# Patient Record
Sex: FEMALE | Race: ASIAN | NOT HISPANIC OR LATINO | Employment: FULL TIME | URBAN - METROPOLITAN AREA
[De-identification: names, ages, dates, MRNs, and addresses within clinical notes are randomized per-mention and may not be internally consistent; named-entity substitution may affect disease eponyms.]

---

## 2023-06-26 ENCOUNTER — HOSPITAL ENCOUNTER (EMERGENCY)
Facility: CLINIC | Age: 43
Discharge: HOME | End: 2023-06-26
Attending: EMERGENCY MEDICINE | Admitting: EMERGENCY MEDICINE
Payer: COMMERCIAL

## 2023-06-26 VITALS
DIASTOLIC BLOOD PRESSURE: 62 MMHG | HEART RATE: 85 BPM | RESPIRATION RATE: 20 BRPM | SYSTOLIC BLOOD PRESSURE: 97 MMHG | TEMPERATURE: 97.7 F | OXYGEN SATURATION: 99 %

## 2023-06-26 DIAGNOSIS — D64.9 ANEMIA, UNSPECIFIED TYPE: ICD-10-CM

## 2023-06-26 DIAGNOSIS — R19.7 DIARRHEA, UNSPECIFIED TYPE: ICD-10-CM

## 2023-06-26 DIAGNOSIS — R55 SYNCOPE, UNSPECIFIED SYNCOPE TYPE: ICD-10-CM

## 2023-06-26 LAB
ALBUMIN SERPL BCG-MCNC: 4 G/DL (ref 3.5–5.2)
ALP SERPL-CCNC: 69 U/L (ref 35–104)
ALT SERPL W P-5'-P-CCNC: 16 U/L (ref 0–50)
ANION GAP SERPL CALCULATED.3IONS-SCNC: 15 MMOL/L (ref 7–15)
AST SERPL W P-5'-P-CCNC: 28 U/L (ref 0–45)
ATRIAL RATE - MUSE: 84 BPM
BASOPHILS # BLD AUTO: 0.1 10E3/UL (ref 0–0.2)
BASOPHILS NFR BLD AUTO: 1 %
BILIRUB DIRECT SERPL-MCNC: <0.2 MG/DL (ref 0–0.3)
BILIRUB SERPL-MCNC: <0.2 MG/DL
BUN SERPL-MCNC: 14 MG/DL (ref 6–20)
CALCIUM SERPL-MCNC: 8.7 MG/DL (ref 8.6–10)
CHLORIDE SERPL-SCNC: 102 MMOL/L (ref 98–107)
CREAT SERPL-MCNC: 0.74 MG/DL (ref 0.51–0.95)
DEPRECATED HCO3 PLAS-SCNC: 18 MMOL/L (ref 22–29)
DIASTOLIC BLOOD PRESSURE - MUSE: NORMAL MMHG
EOSINOPHIL # BLD AUTO: 0.3 10E3/UL (ref 0–0.7)
EOSINOPHIL NFR BLD AUTO: 2 %
ERYTHROCYTE [DISTWIDTH] IN BLOOD BY AUTOMATED COUNT: 16.5 % (ref 10–15)
GFR SERPL CREATININE-BSD FRML MDRD: >90 ML/MIN/1.73M2
GLUCOSE SERPL-MCNC: 155 MG/DL (ref 70–99)
HBA1C MFR BLD: 5.6 %
HCG SERPL QL: NEGATIVE
HCT VFR BLD AUTO: 31.8 % (ref 35–47)
HGB BLD-MCNC: 9.7 G/DL (ref 11.7–15.7)
IMM GRANULOCYTES # BLD: 0.1 10E3/UL
IMM GRANULOCYTES NFR BLD: 0 %
INTERPRETATION ECG - MUSE: NORMAL
LIPASE SERPL-CCNC: 38 U/L (ref 13–60)
LYMPHOCYTES # BLD AUTO: 3.1 10E3/UL (ref 0.8–5.3)
LYMPHOCYTES NFR BLD AUTO: 21 %
MCH RBC QN AUTO: 21.8 PG (ref 26.5–33)
MCHC RBC AUTO-ENTMCNC: 30.5 G/DL (ref 31.5–36.5)
MCV RBC AUTO: 72 FL (ref 78–100)
MONOCYTES # BLD AUTO: 0.9 10E3/UL (ref 0–1.3)
MONOCYTES NFR BLD AUTO: 6 %
NEUTROPHILS # BLD AUTO: 10.7 10E3/UL (ref 1.6–8.3)
NEUTROPHILS NFR BLD AUTO: 70 %
NRBC # BLD AUTO: 0 10E3/UL
NRBC BLD AUTO-RTO: 0 /100
P AXIS - MUSE: 79 DEGREES
PLATELET # BLD AUTO: 316 10E3/UL (ref 150–450)
POTASSIUM SERPL-SCNC: 3.6 MMOL/L (ref 3.4–5.3)
PR INTERVAL - MUSE: 158 MS
PROT SERPL-MCNC: 6.9 G/DL (ref 6.4–8.3)
QRS DURATION - MUSE: 76 MS
QT - MUSE: 386 MS
QTC - MUSE: 456 MS
R AXIS - MUSE: 83 DEGREES
RBC # BLD AUTO: 4.44 10E6/UL (ref 3.8–5.2)
SODIUM SERPL-SCNC: 135 MMOL/L (ref 136–145)
SYSTOLIC BLOOD PRESSURE - MUSE: NORMAL MMHG
T AXIS - MUSE: 66 DEGREES
TROPONIN T SERPL HS-MCNC: <6 NG/L
VENTRICULAR RATE- MUSE: 84 BPM
WBC # BLD AUTO: 15.1 10E3/UL (ref 4–11)

## 2023-06-26 PROCEDURE — 84703 CHORIONIC GONADOTROPIN ASSAY: CPT | Performed by: EMERGENCY MEDICINE

## 2023-06-26 PROCEDURE — 258N000003 HC RX IP 258 OP 636: Performed by: EMERGENCY MEDICINE

## 2023-06-26 PROCEDURE — 83036 HEMOGLOBIN GLYCOSYLATED A1C: CPT | Performed by: EMERGENCY MEDICINE

## 2023-06-26 PROCEDURE — 96361 HYDRATE IV INFUSION ADD-ON: CPT

## 2023-06-26 PROCEDURE — 96360 HYDRATION IV INFUSION INIT: CPT

## 2023-06-26 PROCEDURE — 36415 COLL VENOUS BLD VENIPUNCTURE: CPT | Performed by: EMERGENCY MEDICINE

## 2023-06-26 PROCEDURE — 82248 BILIRUBIN DIRECT: CPT | Performed by: EMERGENCY MEDICINE

## 2023-06-26 PROCEDURE — 250N000011 HC RX IP 250 OP 636: Mod: JZ

## 2023-06-26 PROCEDURE — 85014 HEMATOCRIT: CPT | Performed by: EMERGENCY MEDICINE

## 2023-06-26 PROCEDURE — 99284 EMERGENCY DEPT VISIT MOD MDM: CPT

## 2023-06-26 PROCEDURE — 80053 COMPREHEN METABOLIC PANEL: CPT | Performed by: EMERGENCY MEDICINE

## 2023-06-26 PROCEDURE — 83690 ASSAY OF LIPASE: CPT | Performed by: EMERGENCY MEDICINE

## 2023-06-26 PROCEDURE — 93005 ELECTROCARDIOGRAM TRACING: CPT

## 2023-06-26 PROCEDURE — 84484 ASSAY OF TROPONIN QUANT: CPT | Performed by: EMERGENCY MEDICINE

## 2023-06-26 RX ORDER — ONDANSETRON 2 MG/ML
INJECTION INTRAMUSCULAR; INTRAVENOUS
Status: COMPLETED
Start: 2023-06-26 | End: 2023-06-26

## 2023-06-26 RX ADMIN — SODIUM CHLORIDE 1000 ML: 9 INJECTION, SOLUTION INTRAVENOUS at 01:30

## 2023-06-26 RX ADMIN — ONDANSETRON 4 MG: 2 INJECTION INTRAMUSCULAR; INTRAVENOUS at 01:31

## 2023-06-26 ASSESSMENT — ACTIVITIES OF DAILY LIVING (ADL)
ADLS_ACUITY_SCORE: 35
ADLS_ACUITY_SCORE: 35

## 2023-06-26 NOTE — ED NOTES
Bed: ED11  Expected date:   Expected time:   Means of arrival:   Comments:  854 54F syncope episode

## 2023-06-26 NOTE — ED TRIAGE NOTES
"Pt BIBA folowing syncopal episode. Pt reports she went to a [employer] party earlier today but did not consume any alcohol. Was laying in bed and had abdominal cramping and urge to defecate. On her way to bathroom pt had syncopal episode and recovered.. was taken by daughter  to bathroom and had an  \"explosive diarrhea\" per her own report. EMS states she had a manic excited episode by the time they got there but she recovered to baseline A&O x 4  after a few minutes.   Pt c/o nausea. Denies vomiting. Having no abdominal pain at the moment.    Zahra Pascal RN        "

## 2023-06-26 NOTE — ED PROVIDER NOTES
History   Chief Complaint:  Vomiting, and syncope     The history is provided by the patient and the spouse.      Brianna Hernandez is a 42 year old female with a history of vaso vagal who presents with nausea, vomiting, and syncope. The patient reports that after the syncopal episode, she had watery diarrhea with associated nausea. Before the episode she was feeling very light headed, had a slight headache and had nausea. She denies any fever, or abdominal pain. She notes a history of vaso vagal syncope.  She had some minor palpitations and chest pain but none now.  No shortness of breath    Independent Historian:   The patient's spouse reports that she was in the bathroom and had laid down when she had an episode of syncope, and was unconscious for 20-30 seconds. She was nauseous, but did not vomit. He witnessed the episode, and saw no tremors or other seizure like activity. He saw no injuries from the fall, as she was sitting when it happened. They were at a graduation party today and had new food there, but denies any other dietary changes. She has been more stressed recently.     Review of External Notes:   I reviewed the patient's past medical records, including her previous laboratory findings from 2020, when her hemoglobin was 11.6.      Medications:    The patient is currently on no regular medications.     Past Medical History:    Vaso vagal episode    Hyperhidrosis  Anemia   Fibroadenoma    Hyperemesis   Vitamin D deficiency     Physical Exam     Patient Vitals for the past 24 hrs:   BP Temp Temp src Pulse Resp SpO2   06/26/23 0132 -- 97.7  F (36.5  C) Oral -- 22 --   06/26/23 0045 110/70 -- -- 78 17 99 %      Physical Exam  General: Sitting up in bed  Eyes:  The pupils are equal and round    Conjunctivae and sclerae are normal  ENT:    Wearing a mask  Neck:  Normal range of motion  CV:  Regular rate, regular rhythm    Skin warm and well perfused   Resp:  Non labored breathing on room air    No  tachypnea    No cough heard, lungs clear bilaterally  GI:  Abdomen is soft, there is no rigidity    No distension    No rebound tenderness     No abdominal tenderness  MS:  Normal muscular tone  Skin:  No rash or acute skin lesions noted  Neuro:   Awake, alert.      Speech is normal and fluent.    Face is symmetric.     Moves all extremities equally  Psych: Normal affect.  Appropriate interactions.    Emergency Department Course   ECG  ECG taken at 0131, ECG read at 0134  Normal sinus rhythm   Normal ECG   No significant change as compared to prior, dated 7/22/07.  Rate 84 bpm. AK interval 158 ms. QRS duration 76 ms. QT/QTc 386/456 ms. P-R-T axes 79 83 66.     Laboratory:  Labs Ordered and Resulted from Time of ED Arrival to Time of ED Departure   BASIC METABOLIC PANEL - Abnormal       Result Value    Sodium 135 (*)     Potassium 3.6      Chloride 102      Carbon Dioxide (CO2) 18 (*)     Anion Gap 15      Urea Nitrogen 14.0      Creatinine 0.74      Calcium 8.7      Glucose 155 (*)     GFR Estimate >90     CBC WITH PLATELETS AND DIFFERENTIAL - Abnormal    WBC Count 15.1 (*)     RBC Count 4.44      Hemoglobin 9.7 (*)     Hematocrit 31.8 (*)     MCV 72 (*)     MCH 21.8 (*)     MCHC 30.5 (*)     RDW 16.5 (*)     Platelet Count 316      % Neutrophils 70      % Lymphocytes 21      % Monocytes 6      % Eosinophils 2      % Basophils 1      % Immature Granulocytes 0      NRBCs per 100 WBC 0      Absolute Neutrophils 10.7 (*)     Absolute Lymphocytes 3.1      Absolute Monocytes 0.9      Absolute Eosinophils 0.3      Absolute Basophils 0.1      Absolute Immature Granulocytes 0.1      Absolute NRBCs 0.0     TROPONIN T, HIGH SENSITIVITY - Normal    Troponin T, High Sensitivity <6     HCG QUALITATIVE PREGNANCY - Normal    hCG Serum Qualitative Negative     LIPASE - Normal    Lipase 38     HEPATIC FUNCTION PANEL - Normal    Protein Total 6.9      Albumin 4.0      Bilirubin Total <0.2      Alkaline Phosphatase 69      AST 28       ALT 16      Bilirubin Direct <0.20     HEMOGLOBIN A1C - Normal    Hemoglobin A1C 5.6        Emergency Department Course & Assessments:  Interventions:  Medications   0.9% sodium chloride BOLUS (1,000 mLs Intravenous $New Bag 6/26/23 0130)   ondansetron (ZOFRAN) 2 MG/ML injection (4 mg  $Given 6/26/23 0131)      Assessments:  0150 I obtained history and examined the patient as reported above.   0410  I rechecked the patient and discussed her discharge to home.     Independent Interpretation (X-rays, CTs, rhythm strip):  None    Consultations/Discussion of Management or Tests:  None     Social Determinants of Health affecting care:   None    Disposition:  The patient was discharged to home.     Impression & Plan    Medical Decision Making:  Brianna Hernandez is a 42-year-old female who presented to the emergency department with syncope.  Has a history of vasovagal syncope which is likely reason today.  She was nauseous and feeling very lightheaded prior to the event which is why she laid down.  There are no injuries from the syncopal episode.  Does not sound consistent with seizure.  Did have diarrhea after the syncopal episode.  No abdominal tenderness on exam.  She is feeling improved here in the emergency department.  Blood work shows mild anemia.  She does have heavy menstrual periods which is likely cause of anemia.  Seems unlikely that this is can attributing to today.  She has no active bleeding right now.  No severe headache to suggest subarachnoid hemorrhage.  She feels improved in the emergency department and feels back to normal.  Able to ambulate and tolerate p.o. intake in the emergency department.  Doubt arrhythmia, ACS, PE, AAA or other concerning etiologies of syncope.  Follow-up with primary care provider for recheck of hemoglobin.    Diagnosis:    ICD-10-CM    1. Syncope, unspecified syncope type  R55       2. Diarrhea, unspecified type  R19.7       3. Anemia, unspecified type  D64.9            Scribe  Disclosure:  I, Anand Odennway, am serving as a scribe at 12:37 AM on 6/26/2023 to document services personally performed by Jackelin Rico MD based on my observations and the provider's statements to me.      6/26/2023   Jackelin Rico MD Goertz, Maria Kristine, MD  06/26/23 0724

## 2023-06-26 NOTE — DISCHARGE INSTRUCTIONS
Follow up with your regular doctor for recheck of hemoglobin  Make sure to stay hydrated    Discharge Instructions  Syncope    Syncope (fainting) is a sudden, short loss of consciousness (passing out spell). People will usually fall to the ground when they faint or slump over if seated.  People may also shake when this happens, and it can sometimes be difficult to tell the difference between syncope and a seizure. At this time, your provider does not find a reason to suspect that your fainting spell is a sign of anything dangerous or life-threatening.  However, sometimes the signs of serious illness do not show up right away.     Generally, every Emergency Department visit should have a follow-up clinic visit with either a primary or a specialty clinic/provider. Please follow-up as instructed by your emergency provider today.    Return to the Emergency Department if:  You faint again.   You have any significant bleeding.  You have chest pain or a fast or irregular heartbeat.  You feel short of breath.  You cough up any blood.  You have abdominal (belly) pain or unusual back pain.  You have ongoing vomiting (throwing up) or diarrhea (loose stools).  You have a black or tarry bowel movement, or blood in the stool or in your vomit.  You have a fever over 101 F.  You lose feeling or cannot move a part of your body or cannot talk normally.  You are confused, have a headache, cannot see well, or have a seizure.  DO NOT DRIVE. CALL 911 INSTEAD!    What can I do to help myself?  Follow any specific instructions that your provider discussed with you.  If you feel light-headed, make sure to sit down right away, even if you have to sit on the floor.  Follow up with your regular medical provider as discussed for further management. This may include lowering your blood pressure medications, insulin or other diabetic medications, checking your blood sugar more frequently, and drinking more fluids, taking medicines for vomiting or  diarrhea or getting up slower.  If you were given a prescription for medicine here today, be sure to read all of the information (including the package insert) that comes with your prescription.  This will include important information about the medicine, its side effects, and any warnings that you need to know about.  The pharmacist who fills the prescription can provide more information and answer questions you may have about the medicine.  If you have questions or concerns that the pharmacist cannot address, please call or return to the Emergency Department.   Remember that you can always come back to the Emergency Department if you are not able to see your regular provider in the amount of time listed above, if you get any new symptoms, or if there is anything that worries you.

## 2024-04-19 ENCOUNTER — TRANSFERRED RECORDS (OUTPATIENT)
Dept: HEALTH INFORMATION MANAGEMENT | Facility: CLINIC | Age: 44
End: 2024-04-19

## 2024-05-22 ENCOUNTER — PATIENT OUTREACH (OUTPATIENT)
Dept: ONCOLOGY | Facility: CLINIC | Age: 44
End: 2024-05-22
Payer: COMMERCIAL

## 2024-05-22 ENCOUNTER — TRANSCRIBE ORDERS (OUTPATIENT)
Dept: ONCOLOGY | Facility: CLINIC | Age: 44
End: 2024-05-22
Payer: COMMERCIAL

## 2024-05-22 DIAGNOSIS — R68.89 SUSPECTED MALIGNANT NEOPLASM OF OVARY: Primary | ICD-10-CM

## 2024-05-22 NOTE — PROGRESS NOTES
"New Patient Hematology / Oncology Nurse Navigator Note     Referral Date: 5/22/24    Referring provider:   Cristina [place] [address]   [phone] (Work)   [phone] (Fax)    Referring Clinic/Organization: [facility]     Referred to: GynOnc    Requested provider (if applicable): Recommended any provider, highly recommend: Dr. Causey, Dr. Ramon, Dr. Hernandez, Dr. Shepherd     Evaluation for : S/p hysterectomy. Pathology with STIL on fallopian tube removed during surgery, negative for oncogenes, still has increased risk given family history      Clinical History (per Nurse review of records provided):    5/20/24 Office Visit with OBGYN at :  \"STIL: I would still recommend consultation with gyn onc regarding STIL with ovaries still in body. They may recommend oopherectomy regardless of test results now or at an age closer to normal menopause. Or, they may recommend interval ca 125 and pelvic ultrasound. Pt is in agreement. Scheduling information reveiwed. \"-- BOOKMARKED  3/20/24 Path:  Final Diagnosis A) UTERUS WITH CERVIX AND FALLOPIAN TUBES, TOTAL HYSTERECTOMY WITH BILATERAL SALPINGECTOMY:  1. Cervix: No significant histologic abnormality  2. Endometrium: Proliferative phase  3. Myometrium: Leiomyomas  4. Uterine serosa: Unremarkable  5. Right fallopian tube (entirely submitted):     a. No significant histologic abnormality     b. Metallic coil grossly identified  6. Left fallopian tube (entirely submitted):     a. Incidental serous tubal intraepithelial lesion (STIL)     b. Metallic coil grossly identified  7. Uterine weight: 123 grams  8. Negative for malignancy   11/13/23 Pelvic US -- BOOKMARKED      Clinical Assessment / Barriers to Care (Per Nurse):  Pt lives in [place], [place]    Records Location: Care Everywhere     Records Needed:   Images from     Additional testing needed prior to consult:   N/A    Referral updates and Plan:   OUTGOING CALL to pt:  Introduced my " role as nurse navigator with [facility] Hematology/Oncology dept and that we have recd the referral to GynOnc from Dr Tracy  Pt confirms they are aware of the referral and ready to schedule  Provided contact information if future questions arise.     -Transferred pt to NPS line [phone] to schedule appt per scheduling instructions.      Felicity Burks, BSN, RN, PHN, OCN  Hematology/Oncology Nurse Navigator  [facility]  [phone]

## 2024-06-07 NOTE — TELEPHONE ENCOUNTER
Action 2024 8:56 AM ABT   Action Taken Records from  received and sent to HIM for upload and to  email for review     Action 2024 11:55 AM ABT   Action Taken 16 Slides from [facility] received and taken to 5th floor path lab for review.    24: [id number]     Imaging Received  2024 2:27 PM ABT   Action: US Images from  received and resolved to PACS.     RECORDS STATUS - ALL OTHER DIAGNOSIS      RECORDS RECEIVED FROM: Karen BRUMFIELD   NOTES STATUS DETAILS   OFFICE NOTE from referring provider [facility] 24: Dr. Cristina Tracy   [facility]   OPERATIVE REPORT [facility] 24: Total laparoscopic hysterectomy, bilateral salpingectomy, cystoscopy    MEDICATION LIST [facility]    LABS     PATHOLOGY REPORTS Report in [facility] &   Req per ANGIE Abebe & provider  [facility]:  24: [id number]    HP:  23:[id number]   ANYTHING RELATED TO DIAGNOSIS [facility] Most recent 24   GENONOMIC TESTING     TYPE: External: [facility] 24: [id number]  24: [id number]  24: [id number]   IMAGING (NEED IMAGES & REPORT)     ULTRASOUND PACS HP:  23-12/21/15: US Pelvic

## 2024-06-11 ENCOUNTER — PRE VISIT (OUTPATIENT)
Dept: ONCOLOGY | Facility: CLINIC | Age: 44
End: 2024-06-11
Payer: COMMERCIAL

## 2024-06-17 ENCOUNTER — LAB REQUISITION (OUTPATIENT)
Dept: LAB | Facility: CLINIC | Age: 44
End: 2024-06-17
Payer: COMMERCIAL

## 2024-06-17 LAB
PATH REPORT.COMMENTS IMP SPEC: NORMAL
PATH REPORT.FINAL DX SPEC: NORMAL
PATH REPORT.GROSS SPEC: NORMAL
PATH REPORT.MICROSCOPIC SPEC OTHER STN: NORMAL
PATH REPORT.RELEVANT HX SPEC: NORMAL
PATH REPORT.RELEVANT HX SPEC: NORMAL
PATH REPORT.SITE OF ORIGIN SPEC: NORMAL

## 2024-06-17 PROCEDURE — 88321 CONSLTJ&REPRT SLD PREP ELSWR: CPT | Performed by: PATHOLOGY

## 2024-06-25 ENCOUNTER — ONCOLOGY VISIT (OUTPATIENT)
Dept: ONCOLOGY | Facility: CLINIC | Age: 44
End: 2024-06-25
Attending: OBSTETRICS & GYNECOLOGY
Payer: COMMERCIAL

## 2024-06-25 VITALS
HEIGHT: 65 IN | DIASTOLIC BLOOD PRESSURE: 77 MMHG | RESPIRATION RATE: 16 BRPM | TEMPERATURE: 98.5 F | HEART RATE: 80 BPM | OXYGEN SATURATION: 100 % | SYSTOLIC BLOOD PRESSURE: 110 MMHG | BODY MASS INDEX: 23.16 KG/M2 | WEIGHT: 139 LBS

## 2024-06-25 DIAGNOSIS — N83.9 LESION OF FALLOPIAN TUBE: Primary | ICD-10-CM

## 2024-06-25 PROCEDURE — 99205 OFFICE O/P NEW HI 60 MIN: CPT | Performed by: OBSTETRICS & GYNECOLOGY

## 2024-06-25 PROCEDURE — 99213 OFFICE O/P EST LOW 20 MIN: CPT | Performed by: OBSTETRICS & GYNECOLOGY

## 2024-06-25 ASSESSMENT — PAIN SCALES - GENERAL: PAINLEVEL: NO PAIN (0)

## 2024-06-25 NOTE — PROGRESS NOTES
Gynecologic Oncology New Patient Consult    Referring provider:    Cristina Tracy MD  [facility]  [address] [place] [address]   RE: Brianna Hernandez  : 1980  DANIEL: 2024      CC: STIC lesion     HPI: Ms Brianna Hernandez is a 43 year old year old female who presents for consultation.     Patient has an abnormal pathology finding, STIL of the left fallopian tube. Discussed in depth. She has a family history of both breast and ovarian cancer. Mother had breast cancer age 53. Maternal Aunt has had ovarian cancer, another maternal aunt with ovarian cancer. And Brianna was two 1st cousins with ovarian cancer. None have been tested for BRCA. She was seen by the cancer geneticis, and was found to have overall risk of 19% and was told she could consider genetic testing. She then underwent a TLH, BS  with general gynecology which was positive for a STIL lesion for which she was then referred to gyn oncology.     Post-operatively she is healing well. No vaginal bleeding, energy back to normal.     Treatment History:    3/20/24: TLH, BS  Had heavy periods and decided to undergo surgery.     Surg Path: OSH: Pathology  A) UTERUS WITH CERVIX AND FALLOPIAN TUBES, TOTAL HYSTERECTOMY WITH BILATERAL SALPINGECTOMY:   1. Cervix: No significant histologic abnormality   2. Endometrium: Proliferative phase   3. Myometrium: Leiomyomas   4. Uterine serosa: Unremarkable   5. Right fallopian tube (entirely submitted):   a. No significant histologic abnormality   b. Metallic coil grossly identified   6. Left fallopian tube (entirely submitted):   a. Incidental serous tubal intraepithelial lesion (STIL)   b. Metallic coil grossly identified   7. Uterine weight: 123 grams   8. 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  Genetic testing negative.    PMH:  Negative    PSH:  Hysterectomy    FH:  Mom breast cancer (53),  No genetic testing.    SH:  Lives with  and 2 kids.  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      No past medical history on file.    No past surgical history on file.     Medications and allergies reviewed in EPIC    Social History:  Social History     Tobacco Use    Smoking status: Not on file    Smokeless tobacco: Not on file   Substance Use Topics    Alcohol use: Not on file     Family History:   The patient's family history is notable for :.    Physical Exam:     There were no vitals taken for this visit.  There is no height or weight on file to calculate BMI.    General: Alert and oriented, no acute distress  Psych: Mood stable. Linear speech, appropriate affect  CV: RRR  Lungs: CTA  GI: No distention. No masses. No hernia.   Lymph: No enlarge lymph nodes in neck or groin  Abdomen: well healed surgical scars, no hepatomegaly   : Normal external genitalia.     Assessment:    Brianna Hernandez is a 43 year old woman with a new diagnosis of STIC lesion.        60 minutes spent on the date of the encounter doing chart review, history and exam, documentation and further activities as noted above    The longitudinal plan of care for the diagnosis(es)/condition(s) as documented were addressed during this visit. Due to the added complexity in care, I will continue to support Brianna in the subsequent management and with ongoing continuity of care.    Plan:     1.)   STIC Lesions :  Pathology 2nd opinion reviewed in detail.  Reviewed that STIC lesions have been proposed as a precursor of high grade serous carcinoma.  Incidence primarily reported in patients with BRCA mutations or strong family history with range of  0.6-6%.  This patient has a strong family history but her genetic testing is negative. In patients with incidental STIC lesions within general population, the prevelence of STIC is unclear, possibly in range of 0.76%.  Clinical signifcance and prognosis less clear.  Given her family history as well as STIC lesion, recommend completion staging with pelvic washings, bilateral oophorectomy, omental and peritoneal biopsies. Discussed risks, benefits, and alternatives to procedure.      Patient would like me to proceed with scheduling surgery.  Her  was present on phone for discussion.  She and her  are also going to get a second opinion with his sister who is a physician in [place].    Recommend:  Laparoscopic bilateral oophorectomy, peritoneal biopsies, omental biopsy, peritoneal washings, possible laparotomy and cancer staging     Risks, benefits and alternatives to proceed discussed in detail with the patient. Risks include but are not limited to bleeding, infection, possible injury to surrounding organs including bowel, bladder, ureter, need for second procedure/surgery related to complications from first procedure, postoperative medical complications such as cardiopulmonary events, lymphedema, lymphocyst, thromboembolic events. Also discussed specific risks related to the procedure including conversion to laparotomy, surgical menopause.. Consent for surgery, blood transfusion signed.  Will arrange appropriate preoperative blood work, CT. Patient also advised on need for postoperative surveillance and/or adjuvant therapy. Questions answered, patient will be contacted with a surgical date.    Netta Shepherd MD  Gynecologic Oncology  [facility]

## 2024-06-25 NOTE — LETTER
2024      Brianna Hernandez  [address] [place] [address]      Dear Colleague,    Thank you for referring your patient, Brianna Hernandez, to the [facility]. Please see a copy of my visit note below.    Gynecologic Oncology New Patient Consult    Referring provider:    Cristina Tracy MD  [facility]  [facility] [address]  [place] [address]   RE: Brianna Hernandez  : 1980  DANIEL: 2024      CC: STIC lesion     HPI: Ms Brianna Hernandez is a 43 year old year old female who presents for consultation.     Patient has an abnormal pathology finding, STIL of the left fallopian tube. Discussed in depth. She has a family history of both breast and ovarian cancer. Mother had breast cancer age 53. Maternal Aunt has had ovarian cancer, another maternal aunt with ovarian cancer. And Brianna was two 1st cousins with ovarian cancer. None have been tested for BRCA. She was seen by the cancer geneticis, and was found to have overall risk of 19% and was told she could consider genetic testing. She then underwent a TLH, BS  with general gynecology which was positive for a STIL lesion for which she was then referred to gyn oncology.     Post-operatively she is healing well. No vaginal bleeding, energy back to normal.     Treatment History:    3/20/24: TLH, BS  Had heavy periods and decided to undergo surgery.     Surg Path: OSH: Pathology  A) UTERUS WITH CERVIX AND FALLOPIAN TUBES, TOTAL HYSTERECTOMY WITH BILATERAL SALPINGECTOMY:   1. Cervix: No significant histologic abnormality   2. Endometrium: Proliferative phase   3. Myometrium: Leiomyomas   4. Uterine serosa: Unremarkable   5. Right fallopian tube (entirely submitted):   a. No significant histologic abnormality   b. Metallic coil grossly identified   6. Left fallopian tube (entirely submitted):   a. Incidental serous tubal intraepithelial lesion (STIL)   b. Metallic coil grossly identified   7. Uterine weight:  123 grams   8. 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  Genetic testing negative.    PMH:  Negative    PSH:  Hysterectomy    FH:  Mom breast cancer (53),  No genetic testing.    SH:  Lives with  and 2 kids.  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      No past medical history on file.    No past surgical history on file.     Medications and allergies reviewed in EPIC    Social History:  Social History     Tobacco Use     Smoking status: Not on file     Smokeless tobacco: Not on file   Substance Use Topics     Alcohol use: Not on file     Family History:   The patient's family history is notable for :.    Physical Exam:     There were no vitals taken for this visit.  There is no height or weight on file to calculate BMI.    General: Alert and oriented, no acute distress  Psych: Mood stable. Linear speech, appropriate affect  CV: RRR  Lungs: CTA  GI: No distention. No masses. No hernia.   Lymph: No enlarge lymph nodes in neck or groin  Abdomen: well healed surgical scars, no hepatomegaly   : Normal external genitalia.     Assessment:    Brianna Hernandez is a 43 year old woman with a new diagnosis of STIC lesion.        60 minutes spent on the date of the encounter doing chart review, history and exam, documentation and further activities as noted above    The longitudinal plan of care for the diagnosis(es)/condition(s) as documented were addressed during this visit. Due to the added complexity in care, I will continue to support Brianna in the subsequent management and with ongoing continuity of care.    Plan:     1.)   STIC Lesions :  Pathology 2nd  opinion reviewed in detail.  Reviewed that STIC lesions have been proposed as a precursor of high grade serous carcinoma.  Incidence primarily reported in patients with BRCA mutations or strong family history with range of 0.6-6%.  This patient has a strong family history but her genetic testing is negative. In patients with incidental STIC lesions within general population, the prevelence of STIC is unclear, possibly in range of 0.76%.  Clinical signifcance and prognosis less clear.  Given her family history as well as STIC lesion, recommend completion staging with pelvic washings, bilateral oophorectomy, omental and peritoneal biopsies. Discussed risks, benefits, and alternatives to procedure.      Patient would like me to proceed with scheduling surgery.  Her  was present on phone for discussion.  She and her  are also going to get a second opinion with his sister who is a physician in [place].    Recommend:  Laparoscopic bilateral oophorectomy, peritoneal biopsies, omental biopsy, peritoneal washings, possible laparotomy and cancer staging     Risks, benefits and alternatives to proceed discussed in detail with the patient. Risks include but are not limited to bleeding, infection, possible injury to surrounding organs including bowel, bladder, ureter, need for second procedure/surgery related to complications from first procedure, postoperative medical complications such as cardiopulmonary events, lymphedema, lymphocyst, thromboembolic events. Also discussed specific risks related to the procedure including conversion to laparotomy, surgical menopause.. Consent for surgery, blood transfusion signed.  Will arrange appropriate preoperative blood work, CT. Patient also advised on need for postoperative surveillance and/or adjuvant therapy. Questions answered, patient will be contacted with a surgical date.    Netta Shepherd MD  Gynecologic Oncology  [facility]      Again, thank  you for allowing me to participate in the care of your patient.        Sincerely,        Netta Shepherd MD

## 2024-06-28 ENCOUNTER — PREP FOR PROCEDURE (OUTPATIENT)
Dept: ONCOLOGY | Facility: CLINIC | Age: 44
End: 2024-06-28
Payer: COMMERCIAL

## 2024-06-28 DIAGNOSIS — D39.8: Primary | ICD-10-CM

## 2024-06-28 RX ORDER — PHENAZOPYRIDINE HYDROCHLORIDE 100 MG/1
200 TABLET, FILM COATED ORAL ONCE
Status: CANCELLED | OUTPATIENT
Start: 2024-06-28 | End: 2024-06-28

## 2024-06-28 RX ORDER — HEPARIN SODIUM 10000 [USP'U]/ML
5000 INJECTION, SOLUTION INTRAVENOUS; SUBCUTANEOUS
Status: CANCELLED | OUTPATIENT
Start: 2024-06-28

## 2024-06-28 RX ORDER — METRONIDAZOLE 500 MG/100ML
500 INJECTION, SOLUTION INTRAVENOUS
Status: CANCELLED | OUTPATIENT
Start: 2024-06-28

## 2024-06-28 NOTE — PROGRESS NOTES
Surgery orders placed for OR on 7/22    Indication:  STIC lesion left fallopian tube    Procedure: Laparoscopic bilateral oophorectomy, peritoneal biopsies, omental biopsy, peritoneal washings, possible laparotomy and cancer staging     Needs labs (ordered), CT (scheduled for 7/3), preop teaching, PAC, consent day of surgery    RNCC Melinda Ignacio    Postop 2 wks CSC, okay to see NP. I will see her also upon my return    Netta Shepherd MD  Gynecologic Oncology  [facility]

## 2024-07-01 ENCOUNTER — TELEPHONE (OUTPATIENT)
Dept: ONCOLOGY | Facility: CLINIC | Age: 44
End: 2024-07-01
Payer: COMMERCIAL

## 2024-07-01 NOTE — TELEPHONE ENCOUNTER
Received call and spoke with patient regarding scheduling surgery with Dr. Shepherd.    Patient requested a sooner surgery date, specifically 7/9-11 or 7/16-18. Writer stated they will need to confer with Dr. Shepherd and will call patient back when more information becomes available.    Patient confirmed understanding    [Writer sent message to Dr. Shepherd about surgery dates]    Ben Bruno on 7/1/2024 at 11:11 AM

## 2024-07-01 NOTE — TELEPHONE ENCOUNTER
Called and spoke with patient regarding scheduling surgery with Dr. Shepherd.    Writer offered surgery date of 7/22/24. Patient inquired about other date options in July; writer informed patient that Dr. Shepherd would need to be consulted about other date options.    Patient stated they will call writer back today 7/1/24 once they have looked at their calendars.    Writer confirmed understanding.    Ben Bruno on 7/1/2024 at 10:54 AM

## 2024-07-02 NOTE — TELEPHONE ENCOUNTER
Received call from patient regarding scheduling surgery with Dr. Shepherd.    Writer informed patient that we are waiting on a response from doctor Joao about patient's questions regarding surgery dates and that writer will call when more information becomes available.    Writer informed patient that H&P and lab work can be scheduled.    H&P: PAC 7/5/24, 7:00 AM    Lab: 7/5/24, 8:15 AM    Patient confirmed all scheduled information.    Patient had questions regarding scheduled CT scan on 7/3/24. Writer stated that questions would be addressed by nursing team.    Ben Bruno on 7/2/2024 at 1:03 PM

## 2024-07-02 NOTE — TELEPHONE ENCOUNTER
FUTURE VISIT INFORMATION      SURGERY INFORMATION:  Date: 7/22/24- Date Changed to 7/12/2024  Location: u or  Surgeon:  Netta Shepherd MD   Anesthesia Type:  general  Procedure: Laparoscopic bilateral oophorectomy, peritoneal biopsies, omental biopsy, peritoneal washings, possible laparotomy and cancer staging   Consult: ov 6/25/24    RECORDS REQUESTED FROM:       Primary Care Provider: [facility]    Most recent EKG+ Tracing: 3/27/24- [facility]    Most recent ECHO: 9/5/23- [facility]

## 2024-07-02 NOTE — TELEPHONE ENCOUNTER
Spoke with patient, confirmed all scheduled information.     Patient is schedule for surgery with: Dr. Shepherd    Surgery Date: 7/22/2024     Location: [facility]    H&P: to be completed by PAC clinic - scheduled on 7/12/24, 3:00 PM    Lab: 7/12/24, 4:15 PM     Post-op:  8/5/24, 3:00 PM, with Hina Driscoll, in-person visit, CSC    Patient will receive a phone call from hospital pre-admission nurses 1-2 days prior to surgery with arrival time and NPO instructions. Patient aware times are subject to change up until day before surgery.     Patient questions/concerns: N/A     Surgery packet to be sent via US mail and via [employer]      Ben Bruno on 7/2/2024 at 2:23 PM

## 2024-07-03 ENCOUNTER — ANCILLARY PROCEDURE (OUTPATIENT)
Dept: CT IMAGING | Facility: CLINIC | Age: 44
End: 2024-07-03
Attending: OBSTETRICS & GYNECOLOGY
Payer: COMMERCIAL

## 2024-07-03 DIAGNOSIS — N83.9 LESION OF FALLOPIAN TUBE: ICD-10-CM

## 2024-07-03 PROCEDURE — 71260 CT THORAX DX C+: CPT | Mod: GC | Performed by: RADIOLOGY

## 2024-07-03 PROCEDURE — 74177 CT ABD & PELVIS W/CONTRAST: CPT | Mod: GC | Performed by: RADIOLOGY

## 2024-07-03 RX ORDER — IOPAMIDOL 755 MG/ML
78 INJECTION, SOLUTION INTRAVASCULAR ONCE
Status: COMPLETED | OUTPATIENT
Start: 2024-07-03 | End: 2024-07-03

## 2024-07-03 RX ADMIN — IOPAMIDOL 78 ML: 755 INJECTION, SOLUTION INTRAVASCULAR at 15:51

## 2024-07-03 NOTE — DISCHARGE INSTRUCTIONS

## 2024-07-03 NOTE — TELEPHONE ENCOUNTER
FUTURE VISIT INFORMATION      SURGERY INFORMATION:  Date: 2024  Location: U OR  Surgeon:  Netta Shepherd MD   Anesthesia Type: General   Procedure: Laparoscopic bilateral oophorectomy, peritoneal biopsies, omental biopsy, peritoneal washings   possible laparotomy and cancer staging       RECORDS REQUESTED FROM:       Primary Care Provider: Dr. Mareclla Wray     Pertinent Medical History:    Most recent EKG+ Tracin2023

## 2024-07-05 ENCOUNTER — PRE VISIT (OUTPATIENT)
Dept: SURGERY | Facility: CLINIC | Age: 44
End: 2024-07-05

## 2024-07-11 LAB
ABO/RH(D): NORMAL
ANTIBODY SCREEN: NEGATIVE
SPECIMEN EXPIRATION DATE: NORMAL

## 2024-07-12 ENCOUNTER — PRE VISIT (OUTPATIENT)
Dept: SURGERY | Facility: CLINIC | Age: 44
End: 2024-07-12

## 2024-07-12 ENCOUNTER — OFFICE VISIT (OUTPATIENT)
Dept: SURGERY | Facility: CLINIC | Age: 44
End: 2024-07-12
Payer: COMMERCIAL

## 2024-07-12 ENCOUNTER — LAB (OUTPATIENT)
Dept: LAB | Facility: CLINIC | Age: 44
End: 2024-07-12
Payer: COMMERCIAL

## 2024-07-12 ENCOUNTER — ANESTHESIA EVENT (OUTPATIENT)
Dept: SURGERY | Facility: CLINIC | Age: 44
End: 2024-07-12
Payer: COMMERCIAL

## 2024-07-12 VITALS
SYSTOLIC BLOOD PRESSURE: 116 MMHG | DIASTOLIC BLOOD PRESSURE: 74 MMHG | HEART RATE: 81 BPM | TEMPERATURE: 98 F | BODY MASS INDEX: 22.49 KG/M2 | WEIGHT: 135 LBS | HEIGHT: 65 IN | OXYGEN SATURATION: 100 %

## 2024-07-12 DIAGNOSIS — Z01.818 PREOP EXAMINATION: Primary | ICD-10-CM

## 2024-07-12 DIAGNOSIS — D39.8: ICD-10-CM

## 2024-07-12 LAB
ALBUMIN SERPL BCG-MCNC: 4.5 G/DL (ref 3.5–5.2)
ALP SERPL-CCNC: 84 U/L (ref 40–150)
ALT SERPL W P-5'-P-CCNC: 15 U/L (ref 0–50)
ANION GAP SERPL CALCULATED.3IONS-SCNC: 10 MMOL/L (ref 7–15)
AST SERPL W P-5'-P-CCNC: 18 U/L (ref 0–45)
BASOPHILS # BLD AUTO: 0.1 10E3/UL (ref 0–0.2)
BASOPHILS NFR BLD AUTO: 1 %
BILIRUB SERPL-MCNC: <0.2 MG/DL
BUN SERPL-MCNC: 11.2 MG/DL (ref 6–20)
CALCIUM SERPL-MCNC: 9.4 MG/DL (ref 8.6–10)
CHLORIDE SERPL-SCNC: 104 MMOL/L (ref 98–107)
CREAT SERPL-MCNC: 0.68 MG/DL (ref 0.51–0.95)
DEPRECATED HCO3 PLAS-SCNC: 25 MMOL/L (ref 22–29)
EGFRCR SERPLBLD CKD-EPI 2021: >90 ML/MIN/1.73M2
EOSINOPHIL # BLD AUTO: 0.3 10E3/UL (ref 0–0.7)
EOSINOPHIL NFR BLD AUTO: 3 %
ERYTHROCYTE [DISTWIDTH] IN BLOOD BY AUTOMATED COUNT: 15 % (ref 10–15)
GLUCOSE SERPL-MCNC: 121 MG/DL (ref 70–99)
HCT VFR BLD AUTO: 42.9 % (ref 35–47)
HGB BLD-MCNC: 13.8 G/DL (ref 11.7–15.7)
IMM GRANULOCYTES # BLD: 0 10E3/UL
IMM GRANULOCYTES NFR BLD: 0 %
LYMPHOCYTES # BLD AUTO: 3 10E3/UL (ref 0.8–5.3)
LYMPHOCYTES NFR BLD AUTO: 33 %
MCH RBC QN AUTO: 25.7 PG (ref 26.5–33)
MCHC RBC AUTO-ENTMCNC: 32.2 G/DL (ref 31.5–36.5)
MCV RBC AUTO: 80 FL (ref 78–100)
MONOCYTES # BLD AUTO: 0.6 10E3/UL (ref 0–1.3)
MONOCYTES NFR BLD AUTO: 7 %
NEUTROPHILS # BLD AUTO: 5 10E3/UL (ref 1.6–8.3)
NEUTROPHILS NFR BLD AUTO: 56 %
NRBC # BLD AUTO: 0 10E3/UL
NRBC BLD AUTO-RTO: 0 /100
PLATELET # BLD AUTO: 259 10E3/UL (ref 150–450)
POTASSIUM SERPL-SCNC: 3.6 MMOL/L (ref 3.4–5.3)
PROT SERPL-MCNC: 7.5 G/DL (ref 6.4–8.3)
RBC # BLD AUTO: 5.37 10E6/UL (ref 3.8–5.2)
SODIUM SERPL-SCNC: 139 MMOL/L (ref 135–145)
WBC # BLD AUTO: 9 10E3/UL (ref 4–11)

## 2024-07-12 PROCEDURE — 86900 BLOOD TYPING SEROLOGIC ABO: CPT | Performed by: OBSTETRICS & GYNECOLOGY

## 2024-07-12 PROCEDURE — 80053 COMPREHEN METABOLIC PANEL: CPT | Performed by: PATHOLOGY

## 2024-07-12 PROCEDURE — 36415 COLL VENOUS BLD VENIPUNCTURE: CPT | Performed by: PATHOLOGY

## 2024-07-12 PROCEDURE — 99203 OFFICE O/P NEW LOW 30 MIN: CPT | Performed by: PHYSICIAN ASSISTANT

## 2024-07-12 PROCEDURE — 99000 SPECIMEN HANDLING OFFICE-LAB: CPT | Performed by: PATHOLOGY

## 2024-07-12 PROCEDURE — 86304 IMMUNOASSAY TUMOR CA 125: CPT | Performed by: OBSTETRICS & GYNECOLOGY

## 2024-07-12 PROCEDURE — 85025 COMPLETE CBC W/AUTO DIFF WBC: CPT | Performed by: PATHOLOGY

## 2024-07-12 ASSESSMENT — PAIN SCALES - GENERAL: PAINLEVEL: NO PAIN (0)

## 2024-07-12 ASSESSMENT — LIFESTYLE VARIABLES: TOBACCO_USE: 0

## 2024-07-12 ASSESSMENT — ENCOUNTER SYMPTOMS: SEIZURES: 0

## 2024-07-12 NOTE — PATIENT INSTRUCTIONS
Preparing for Your Surgery      Name:  Brianna Hernandez   MRN:  [id number]   :  1980   Today's Date:  2024       Arriving for surgery:  Surgery date:  24  Arrival time:  8 am    Please come to:     [facility] [place] [facility]    [address] [place]  [address]     The [facility] [place] Patient/Visitor Ramp is at [address]. Patients and visitors who self-park will receive the reduced hospital parking rate. If the Patient /Visitor Ramp is full, please follow the signs to the [employer] car park located at the main hospital entrance.       parking is available (24 hours/ 7 days a week)      Discounted parking pass options are available for patients and visitors. They can be purchased at the [employer] desk at the [facility] hospital entrance.     -  Stop at the security desk and they will direct surgery patients to Registration and the Surgery Check in and Family Lounge. [phone]        - If you need directions, a wheelchair or an escort please stop at the Information/security desk in the lobby.     What can I eat or drink?  -  You may eat and drink normally up to 8 hours prior to arrival time. (Until 12 Midnight)  -  You may have clear liquids until 2 hours prior to arrival time. (Until 6 am)    Examples of clear liquids:  Water  Clear broth  Juices (apple, white grape, white cranberry  and cider) without pulp  Noncarbonated, powder based beverages  (lemonade and Ken-Aid)  Sodas (Sprite, 7-Up, ginger ale and seltzer)  Coffee or tea (without milk or cream)  Gatorade    -  No Alcohol or cannabis products for at least 24 hours before surgery.     Which medicines can I take?  Hold Aspirin for 7 days before surgery.   Hold Multivitamins for 7 days before surgery. Take the last Multivitamin on 24, and then hold until surgery.  Hold Supplements for 7 days before surgery.  Hold Ibuprofen (Advil,  Motrin) for 1 day before surgery--unless otherwise directed by surgeon.  Hold Naproxen (Aleve) for 4 days before surgery.  Acetaminophen (Tylenol) is okay to take if needed.      -  PLEASE TAKE these medications the day of surgery:  Acetaminophen (Tylenol) if needed.      How do I prepare myself?  - Please take 2 showers (one the night prior to surgery and one the morning of surgery) using Scrubcare or Hibiclens soap.    Use this soap only from the neck to your toes.     Leave the soap on your skin for one minute--then rinse thoroughly.      You may use your own shampoo and conditioner. No other hair products.   - Please remove all jewelry and body piercings.  - No lotions, deodorants or fragrance.  - No makeup or fingernail polish.   - Bring your ID and insurance card.    -If you use a CPAP machine, please bring the CPAP machine, tubing, and mask to hospital.    -If you have a Deep Brain Stimulator, Spinal Cord Stimulator, or any Neuro Stimulator device---you must bring the remote control to the hospital.      ALL PATIENTS GOING HOME THE SAME DAY OF SURGERY ARE REQUIRED TO HAVE A RESPONSIBLE ADULT TO DRIVE AND BE IN ATTENDANCE WITH THEM FOR 24 HOURS FOLLOWING SURGERY.    Covid testing policy as of 12/06/2022  Your surgeon will notify and schedule you for a COVID test if one is needed before surgery--please direct any questions or COVID symptoms to your surgeon      Questions or Concerns:    - For any questions regarding the day of surgery or your hospital stay, please contact the Pre Admission Nursing Office at [phone].       - If you have health changes between today and your surgery, please call your surgeon.       - For questions after surgery, please call your surgeons office.           Current Visitor Guidelines    You may have 2 visitors in the pre op area.    Visiting hours: 8 a.m. to 8:30 p.m.    Patients confirmed or suspected to have symptoms of COVID 19 or flu:     No visitors allowed for adult  patients.   Children (under age 18) can have 1 named visitor.     People who are sick or showing symptoms of COVID 19 or flu:    Are not allowed to visit patients--we can only make exceptions in special situations.       Please follow these guidelines for your visit:          Please maintain social distance          Masking is optional--however at times you may be asked to wear a mask for the safety of yourself and others     Clean your hands with alcohol hand . Do this when you arrive at and leave the building and patient room,    And again after you touch your mask or anything in the room.     Go directly to and from the room you are visiting.     Stay in the patient s room during your visit. Limit going to other places in the hospital as much as possible     Leave bags and jackets at home or in the car.     For everyone s health, please don t come and go during your visit. That includes for smoking   during your visit.

## 2024-07-12 NOTE — H&P
Pre-Operative H & P     CC:  Preoperative exam to assess for increased cardiopulmonary risk while undergoing surgery and anesthesia.    Date of Encounter: 7/12/2024  Primary Care Physician:  Marcella Wray     Reason for visit:   Encounter Diagnoses   Name Primary?    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 Hernandez is s/p total hysterecotmy with bilateral salpingectomy. 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 She now presents for the above procedure.    PMH is otherwise unremarkable.      History is obtained from the patient and chart review    Hx of abnormal bleeding or anti-platelet use: denies    Menstrual history: No LMP recorded. 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 06/2024       Past Surgical History  Past Surgical History:   Procedure Laterality Date    HYSTERECTOMY  03/2024    [facility]       Prior to Admission Medications  Current Outpatient Medications   Medication Sig Dispense Refill    Multiple Vitamin (MULTIVITAMIN ADULT PO) Take by mouth daily         Allergies  No Known Allergies    Social History  Social History     Socioeconomic History    Marital status:      Spouse  name: Not on file    Number of children: Not on file    Years of education: Not on file    Highest education level: Not on file   Occupational History    Not on file   Tobacco Use    Smoking status: Never     Passive exposure: Never    Smokeless tobacco: Never   Substance and Sexual Activity    Alcohol use: Never    Drug use: Never    Sexual activity: Not on file   Other Topics Concern    Not on file   Social History Narrative    Not on file     Social Determinants of Health     Financial Resource Strain: Not At Risk (8/10/2023)    Received from [facility]    Financial Resource Strain     Is it hard for you to pay for the very basics like food, housing, medical care or heating?: No   Food Insecurity: Not At Risk (8/10/2023)    Received from [facility]    Food Insecurity     Does your food run out before you have the money to buy more?: No   Transportation Needs: Not At Risk (8/10/2023)    Received from [facility]    Transportation Needs     Does a lack of transportation keep you from your medical appointments or from getting your medications?: No   Physical Activity: Not on file   Stress: Not on file   Social Connections: Not on file   Interpersonal Safety: Not At Risk (7/7/2023)    Received from [facility]     IP Custom IPV     Do you feel UNSAFE in any of your personal relationships with your family members or any other acquaintances?: No   Housing Stability: Not on file       Family History  Family History   Problem Relation Age of Onset    Anesthesia Reaction No family hx of     Deep Vein Thrombosis (DVT) No family hx of        Review of Systems  The complete review of systems is negative other than noted in the HPI or here.     Anesthesia Evaluation   Pt has had prior anesthetic.     No history of anesthetic complications       ROS/MED HX  ENT/Pulmonary:    (-) tobacco use, asthma, sleep apnea and recent URI   Neurologic:  - neg neurologic ROS  (-) no seizures  "and no CVA   Cardiovascular:     (+)  - -   -  - -                                 Previous cardiac testing   Echo: Date: 9/5/23 Results:  See H&P  Stress Test:  Date: Results:    ECG Reviewed:  Date: Results:    Cath:  Date: Results:      METS/Exercise Tolerance: >4 METS    Hematologic:  - neg hematologic  ROS  (-) history of blood clots and history of blood transfusion   Musculoskeletal:  - neg musculoskeletal ROS     GI/Hepatic:  - neg GI/hepatic ROS  (-) GERD and liver disease   Renal/Genitourinary: Comment: S/p hysterectomy 3/2024   (-) renal disease   Endo:  - neg endo ROS  (-) Type II DM   Psychiatric/Substance Use:  - neg psychiatric ROS     Infectious Disease:  - neg infectious disease ROS     Malignancy:   (+) Malignancy, History of Other.Other CA STIL of the left fallopian tube Active status post.    Other:  - neg other ROS          /74 (BP Location: Right arm, Patient Position: Sitting, Cuff Size: Adult Regular)   Pulse 81   Temp 98  F (36.7  C) (Oral)   Ht 1.651 m (5' 5\")   Wt 61.2 kg (135 lb)   SpO2 100%   BMI 22.47 kg/m      Physical Exam  Constitutional: Awake, alert, cooperative, no apparent distress, and appears stated age.  Eyes: Pupils equal, round and reactive to light, extra ocular muscles intact, sclera clear, conjunctiva normal.  HENT: Normocephalic, oral pharynx with moist mucus membranes, good dentition. No goiter appreciated. No removable dental hardware.  Respiratory: Clear to auscultation bilaterally, no crackles or wheezing. No SOB when supine.  Cardiovascular: Regular rate and rhythm, normal S1 and S2, and no murmur noted.  Carotids +2, no bruits. No edema. Palpable pulses to radial, DP and PT arteries.   GI: Normal bowel sounds, soft, non-distended, non-tender, no masses palpated.    Lymph/Hematologic: No cervical lymphadenopathy and no supraclavicular lymphadenopathy.  Genitourinary:  deferred  Skin: Warm and dry.  No rashes.   Musculoskeletal: full ROM of neck. There is " no redness, warmth, or swelling of the joints. Gross motor strength is normal.    Neurologic: Awake, alert, oriented to name, place and time. Cranial nerves II-XII are grossly intact. Gait is normal. Ambulates from chair to exam table, seats self, lies supine and sits back up w/o assistance.  Neuropsychiatric: Calm, cooperative. Normal affect. Answers questions appropriately, follows commands w/o difficulty.        PRIOR LABS/DIAGNOSTIC STUDIES:    All labs and imaging personally reviewed      Echocardiogram 9/5/23   CONCLUSIONS   Normal study.     MITRAL VALVE   Normal mitral valve structure and function.   Trace mitral regurgitation.     AORTIC VALVE   Normal aortic valve structure and function.     TRICUSPID VALVE   Normal tricuspid valve structure and function.   Pulmonary artery pressures cannot be estimated due to absence of   adequate TR jet.     PULMONIC VALVE   The pulmonic valve cannot be assessed.     LEFT ATRIUM   Normal left atrium.   Left atrial volume index is 16 mL/m^2. (Normal <34mL/m^2).     LEFT VENTRICLE   Left ventricular chamber size is normal.   Normal left ventricular wall thickness.   Global and regional left ventricular function is normal.   Left ventricular ejection fraction is visually estimated at 60%.   Normal left ventricular diastolic function.     RIGHT ATRIUM   Normal right atrium.     RIGHT VENTRICLE   Normal right ventricle size and normal global function.     PERICARDIAL EFFUSION   There is no pericardial effusion.         The patient's records and results personally reviewed by this provider.       LAB/DIAGNOSTIC STUDIES TODAY:  CMP, CBC, T&S,     Assessment    Brianna Hernandez is a 43 year old female seen as a PAC referral for risk assessment and optimization for anesthesia.    Plan/Recommendations  Pt will be optimized for the proposed procedure.  See below for details on the assessment, risk, and preoperative recommendations    NEUROLOGY  - No history of TIA, CVA or  "seizure    -Post Op delirium risk factors:  No risk identified    ENT  - No current airway concerns.  Will need to be reassessed day of surgery.  Mallampati: III  TM: > 3    CARDIAC  - No history of CAD, Hypertension, and Afib  - METS (Metabolic Equivalents)  Patient performs 4 or more METS exercise without symptoms             Total Score: 0      RCRI-Very low risk: Class 1 0.4% complication rate             Total Score: 0        PULMONARY  JOSE Low Risk             Total Score: 0      - Denies asthma or inhaler use  - Tobacco History    History   Smoking Status    Never   Smokeless Tobacco    Never       GI  - Denies GERD  PONV Medium Risk  Total Score: 2           1 AN PONV: Pt is Female    1 AN PONV: Patient is not a current smoker        /RENAL  - s/p hysterectomy 3/2024  - Abnormal pathology finding, STIL of the left fallopian tube    ENDOCRINE    - BMI: Estimated body mass index is 22.47 kg/m  as calculated from the following:    Height as of this encounter: 1.651 m (5' 5\").    Weight as of this encounter: 61.2 kg (135 lb).  Healthy Weight (BMI 18.5-24.9)  - No history of Diabetes Mellitus    HEME  VTE Medium Risk 1.8%             Total Score: 5    VTE: Current cancer      - No history of abnormal bleeding or antiplatelet use.      The patient is aware that the final anesthesia plan will be decided by the assigned anesthesia provider on the date of service.      The patient is optimized for their procedure. AVS with information on surgery time/arrival time, meds and NPO status given by nursing staff. No further diagnostic testing indicated.      On the day of service:     Prep time: 12 minutes  Visit time: 17 minutes  Documentation time: 9 minutes  ------------------------------------------  Total time: 38 minutes      Lenora Hernandez PA-C  Preoperative Assessment Center  [facility]  Phone: [phone]  Fax: [phone]    "

## 2024-07-13 LAB — CANCER AG125 SERPL-ACNC: 6 U/ML

## 2024-07-22 ENCOUNTER — ANESTHESIA (OUTPATIENT)
Dept: SURGERY | Facility: CLINIC | Age: 44
End: 2024-07-22
Payer: COMMERCIAL

## 2024-07-22 ENCOUNTER — HOSPITAL ENCOUNTER (OUTPATIENT)
Facility: CLINIC | Age: 44
Discharge: HOME | End: 2024-07-22
Attending: OBSTETRICS & GYNECOLOGY | Admitting: OBSTETRICS & GYNECOLOGY
Payer: COMMERCIAL

## 2024-07-22 VITALS
OXYGEN SATURATION: 98 % | HEIGHT: 65 IN | BODY MASS INDEX: 22 KG/M2 | DIASTOLIC BLOOD PRESSURE: 76 MMHG | WEIGHT: 132.06 LBS | RESPIRATION RATE: 16 BRPM | SYSTOLIC BLOOD PRESSURE: 107 MMHG | TEMPERATURE: 98.1 F | HEART RATE: 76 BPM

## 2024-07-22 DIAGNOSIS — Z98.890 S/P LAPAROSCOPY: Primary | ICD-10-CM

## 2024-07-22 DIAGNOSIS — D39.8: ICD-10-CM

## 2024-07-22 PROCEDURE — 360N000076 HC SURGERY LEVEL 3, PER MIN: Performed by: OBSTETRICS & GYNECOLOGY

## 2024-07-22 PROCEDURE — 250N000025 HC SEVOFLURANE, PER MIN: Performed by: OBSTETRICS & GYNECOLOGY

## 2024-07-22 PROCEDURE — 710N000010 HC RECOVERY PHASE 1, LEVEL 2, PER MIN: Performed by: OBSTETRICS & GYNECOLOGY

## 2024-07-22 PROCEDURE — 88305 TISSUE EXAM BY PATHOLOGIST: CPT | Mod: TC | Performed by: OBSTETRICS & GYNECOLOGY

## 2024-07-22 PROCEDURE — 710N000012 HC RECOVERY PHASE 2, PER MINUTE: Performed by: OBSTETRICS & GYNECOLOGY

## 2024-07-22 PROCEDURE — 88112 CYTOPATH CELL ENHANCE TECH: CPT | Mod: 26 | Performed by: PATHOLOGY

## 2024-07-22 PROCEDURE — 88305 TISSUE EXAM BY PATHOLOGIST: CPT | Mod: 26 | Performed by: PATHOLOGY

## 2024-07-22 PROCEDURE — 250N000011 HC RX IP 250 OP 636: Performed by: ANESTHESIOLOGY

## 2024-07-22 PROCEDURE — 250N000011 HC RX IP 250 OP 636: Performed by: OBSTETRICS & GYNECOLOGY

## 2024-07-22 PROCEDURE — 250N000009 HC RX 250: Performed by: ANESTHESIOLOGY

## 2024-07-22 PROCEDURE — 999N000141 HC STATISTIC PRE-PROCEDURE NURSING ASSESSMENT: Performed by: OBSTETRICS & GYNECOLOGY

## 2024-07-22 PROCEDURE — 258N000003 HC RX IP 258 OP 636: Performed by: ANESTHESIOLOGY

## 2024-07-22 PROCEDURE — 58661 LAPAROSCOPY REMOVE ADNEXA: CPT | Performed by: ANESTHESIOLOGY

## 2024-07-22 PROCEDURE — 58661 LAPAROSCOPY REMOVE ADNEXA: CPT | Performed by: NURSE ANESTHETIST, CERTIFIED REGISTERED

## 2024-07-22 PROCEDURE — 370N000017 HC ANESTHESIA TECHNICAL FEE, PER MIN: Performed by: OBSTETRICS & GYNECOLOGY

## 2024-07-22 PROCEDURE — 250N000009 HC RX 250: Performed by: OBSTETRICS & GYNECOLOGY

## 2024-07-22 PROCEDURE — 272N000001 HC OR GENERAL SUPPLY STERILE: Performed by: OBSTETRICS & GYNECOLOGY

## 2024-07-22 PROCEDURE — 250N000013 HC RX MED GY IP 250 OP 250 PS 637: Performed by: OBSTETRICS & GYNECOLOGY

## 2024-07-22 PROCEDURE — 250N000013 HC RX MED GY IP 250 OP 250 PS 637

## 2024-07-22 RX ORDER — FENTANYL CITRATE 50 UG/ML
50 INJECTION, SOLUTION INTRAMUSCULAR; INTRAVENOUS EVERY 5 MIN PRN
Status: DISCONTINUED | OUTPATIENT
Start: 2024-07-22 | End: 2024-07-22 | Stop reason: HOSPADM

## 2024-07-22 RX ORDER — HYDROMORPHONE HCL IN WATER/PF 6 MG/30 ML
0.2 PATIENT CONTROLLED ANALGESIA SYRINGE INTRAVENOUS EVERY 5 MIN PRN
Status: DISCONTINUED | OUTPATIENT
Start: 2024-07-22 | End: 2024-07-22 | Stop reason: HOSPADM

## 2024-07-22 RX ORDER — EPHEDRINE SULFATE 50 MG/ML
INJECTION, SOLUTION INTRAMUSCULAR; INTRAVENOUS; SUBCUTANEOUS PRN
Status: DISCONTINUED | OUTPATIENT
Start: 2024-07-22 | End: 2024-07-22

## 2024-07-22 RX ORDER — DEXAMETHASONE SODIUM PHOSPHATE 4 MG/ML
4 INJECTION, SOLUTION INTRA-ARTICULAR; INTRALESIONAL; INTRAMUSCULAR; INTRAVENOUS; SOFT TISSUE
Status: DISCONTINUED | OUTPATIENT
Start: 2024-07-22 | End: 2024-07-22 | Stop reason: HOSPADM

## 2024-07-22 RX ORDER — AMOXICILLIN 250 MG
1-2 CAPSULE ORAL 2 TIMES DAILY
Qty: 30 TABLET | Refills: 0 | Status: SHIPPED | OUTPATIENT
Start: 2024-07-22

## 2024-07-22 RX ORDER — LIDOCAINE HYDROCHLORIDE 20 MG/ML
INJECTION, SOLUTION INFILTRATION; PERINEURAL PRN
Status: DISCONTINUED | OUTPATIENT
Start: 2024-07-22 | End: 2024-07-22

## 2024-07-22 RX ORDER — OXYCODONE HYDROCHLORIDE 5 MG/1
5 TABLET ORAL
Status: DISCONTINUED | OUTPATIENT
Start: 2024-07-22 | End: 2024-07-22 | Stop reason: HOSPADM

## 2024-07-22 RX ORDER — IBUPROFEN 600 MG/1
600 TABLET, FILM COATED ORAL EVERY 6 HOURS PRN
Qty: 12 TABLET | Refills: 0 | Status: SHIPPED | OUTPATIENT
Start: 2024-07-22

## 2024-07-22 RX ORDER — FENTANYL CITRATE 50 UG/ML
25 INJECTION, SOLUTION INTRAMUSCULAR; INTRAVENOUS EVERY 5 MIN PRN
Status: DISCONTINUED | OUTPATIENT
Start: 2024-07-22 | End: 2024-07-22 | Stop reason: HOSPADM

## 2024-07-22 RX ORDER — METRONIDAZOLE 500 MG/100ML
500 INJECTION, SOLUTION INTRAVENOUS
Status: COMPLETED | OUTPATIENT
Start: 2024-07-22 | End: 2024-07-22

## 2024-07-22 RX ORDER — IBUPROFEN 200 MG
800 TABLET ORAL ONCE
Status: COMPLETED | OUTPATIENT
Start: 2024-07-22 | End: 2024-07-22

## 2024-07-22 RX ORDER — PROPOFOL 10 MG/ML
INJECTION, EMULSION INTRAVENOUS PRN
Status: DISCONTINUED | OUTPATIENT
Start: 2024-07-22 | End: 2024-07-22

## 2024-07-22 RX ORDER — CEFAZOLIN SODIUM/WATER 2 G/20 ML
2 SYRINGE (ML) INTRAVENOUS SEE ADMIN INSTRUCTIONS
Status: DISCONTINUED | OUTPATIENT
Start: 2024-07-22 | End: 2024-07-22 | Stop reason: HOSPADM

## 2024-07-22 RX ORDER — FENTANYL CITRATE 50 UG/ML
INJECTION, SOLUTION INTRAMUSCULAR; INTRAVENOUS PRN
Status: DISCONTINUED | OUTPATIENT
Start: 2024-07-22 | End: 2024-07-22

## 2024-07-22 RX ORDER — NALOXONE HYDROCHLORIDE 0.4 MG/ML
0.1 INJECTION, SOLUTION INTRAMUSCULAR; INTRAVENOUS; SUBCUTANEOUS
Status: CANCELLED | OUTPATIENT
Start: 2024-07-22

## 2024-07-22 RX ORDER — NALOXONE HYDROCHLORIDE 0.4 MG/ML
0.1 INJECTION, SOLUTION INTRAMUSCULAR; INTRAVENOUS; SUBCUTANEOUS
Status: DISCONTINUED | OUTPATIENT
Start: 2024-07-22 | End: 2024-07-22 | Stop reason: HOSPADM

## 2024-07-22 RX ORDER — SODIUM CHLORIDE, SODIUM LACTATE, POTASSIUM CHLORIDE, CALCIUM CHLORIDE 600; 310; 30; 20 MG/100ML; MG/100ML; MG/100ML; MG/100ML
INJECTION, SOLUTION INTRAVENOUS CONTINUOUS PRN
Status: DISCONTINUED | OUTPATIENT
Start: 2024-07-22 | End: 2024-07-22

## 2024-07-22 RX ORDER — DEXAMETHASONE SODIUM PHOSPHATE 4 MG/ML
INJECTION, SOLUTION INTRA-ARTICULAR; INTRALESIONAL; INTRAMUSCULAR; INTRAVENOUS; SOFT TISSUE PRN
Status: DISCONTINUED | OUTPATIENT
Start: 2024-07-22 | End: 2024-07-22

## 2024-07-22 RX ORDER — ONDANSETRON 2 MG/ML
4 INJECTION INTRAMUSCULAR; INTRAVENOUS EVERY 30 MIN PRN
Status: DISCONTINUED | OUTPATIENT
Start: 2024-07-22 | End: 2024-07-22 | Stop reason: HOSPADM

## 2024-07-22 RX ORDER — OXYCODONE HYDROCHLORIDE 5 MG/1
5 TABLET ORAL EVERY 6 HOURS PRN
Qty: 6 TABLET | Refills: 0 | Status: SHIPPED | OUTPATIENT
Start: 2024-07-22

## 2024-07-22 RX ORDER — OXYCODONE HYDROCHLORIDE 5 MG/1
5 TABLET ORAL
Status: CANCELLED | OUTPATIENT
Start: 2024-07-22

## 2024-07-22 RX ORDER — CEFAZOLIN SODIUM/WATER 2 G/20 ML
2 SYRINGE (ML) INTRAVENOUS
Status: COMPLETED | OUTPATIENT
Start: 2024-07-22 | End: 2024-07-22

## 2024-07-22 RX ORDER — PHENAZOPYRIDINE HYDROCHLORIDE 200 MG/1
200 TABLET, FILM COATED ORAL ONCE
Status: COMPLETED | OUTPATIENT
Start: 2024-07-22 | End: 2024-07-22

## 2024-07-22 RX ORDER — ONDANSETRON 2 MG/ML
4 INJECTION INTRAMUSCULAR; INTRAVENOUS EVERY 30 MIN PRN
Status: CANCELLED | OUTPATIENT
Start: 2024-07-22

## 2024-07-22 RX ORDER — KETOROLAC TROMETHAMINE 30 MG/ML
INJECTION, SOLUTION INTRAMUSCULAR; INTRAVENOUS PRN
Status: DISCONTINUED | OUTPATIENT
Start: 2024-07-22 | End: 2024-07-22

## 2024-07-22 RX ORDER — OXYCODONE HYDROCHLORIDE 10 MG/1
10 TABLET ORAL
Status: CANCELLED | OUTPATIENT
Start: 2024-07-22

## 2024-07-22 RX ORDER — ONDANSETRON 2 MG/ML
INJECTION INTRAMUSCULAR; INTRAVENOUS PRN
Status: DISCONTINUED | OUTPATIENT
Start: 2024-07-22 | End: 2024-07-22

## 2024-07-22 RX ORDER — SODIUM CHLORIDE, SODIUM LACTATE, POTASSIUM CHLORIDE, CALCIUM CHLORIDE 600; 310; 30; 20 MG/100ML; MG/100ML; MG/100ML; MG/100ML
INJECTION, SOLUTION INTRAVENOUS CONTINUOUS
Status: DISCONTINUED | OUTPATIENT
Start: 2024-07-22 | End: 2024-07-22 | Stop reason: HOSPADM

## 2024-07-22 RX ORDER — HEPARIN SODIUM 5000 [USP'U]/.5ML
5000 INJECTION, SOLUTION INTRAVENOUS; SUBCUTANEOUS
Status: COMPLETED | OUTPATIENT
Start: 2024-07-22 | End: 2024-07-22

## 2024-07-22 RX ORDER — ACETAMINOPHEN 325 MG/1
975 TABLET ORAL ONCE
Status: COMPLETED | OUTPATIENT
Start: 2024-07-22 | End: 2024-07-22

## 2024-07-22 RX ORDER — ONDANSETRON 4 MG/1
4 TABLET, ORALLY DISINTEGRATING ORAL EVERY 30 MIN PRN
Status: DISCONTINUED | OUTPATIENT
Start: 2024-07-22 | End: 2024-07-22 | Stop reason: HOSPADM

## 2024-07-22 RX ORDER — ACETAMINOPHEN 325 MG/1
650 TABLET ORAL EVERY 6 HOURS PRN
Qty: 24 TABLET | Refills: 0 | Status: SHIPPED | OUTPATIENT
Start: 2024-07-22

## 2024-07-22 RX ORDER — LIDOCAINE 40 MG/G
CREAM TOPICAL
Status: DISCONTINUED | OUTPATIENT
Start: 2024-07-22 | End: 2024-07-22 | Stop reason: HOSPADM

## 2024-07-22 RX ORDER — ONDANSETRON 4 MG/1
4 TABLET, ORALLY DISINTEGRATING ORAL EVERY 30 MIN PRN
Status: CANCELLED | OUTPATIENT
Start: 2024-07-22

## 2024-07-22 RX ORDER — HYDROMORPHONE HCL IN WATER/PF 6 MG/30 ML
0.4 PATIENT CONTROLLED ANALGESIA SYRINGE INTRAVENOUS EVERY 5 MIN PRN
Status: DISCONTINUED | OUTPATIENT
Start: 2024-07-22 | End: 2024-07-22 | Stop reason: HOSPADM

## 2024-07-22 RX ORDER — DEXAMETHASONE SODIUM PHOSPHATE 4 MG/ML
4 INJECTION, SOLUTION INTRA-ARTICULAR; INTRALESIONAL; INTRAMUSCULAR; INTRAVENOUS; SOFT TISSUE
Status: CANCELLED | OUTPATIENT
Start: 2024-07-22

## 2024-07-22 RX ADMIN — MIDAZOLAM 2 MG: 1 INJECTION INTRAMUSCULAR; INTRAVENOUS at 09:36

## 2024-07-22 RX ADMIN — HYDROMORPHONE HYDROCHLORIDE 0.4 MG: 0.2 INJECTION, SOLUTION INTRAMUSCULAR; INTRAVENOUS; SUBCUTANEOUS at 12:43

## 2024-07-22 RX ADMIN — SODIUM CHLORIDE, POTASSIUM CHLORIDE, SODIUM LACTATE AND CALCIUM CHLORIDE: 600; 310; 30; 20 INJECTION, SOLUTION INTRAVENOUS at 09:45

## 2024-07-22 RX ADMIN — Medication 50 MG: at 09:52

## 2024-07-22 RX ADMIN — PHENAZOPYRIDINE 200 MG: 200 TABLET ORAL at 08:53

## 2024-07-22 RX ADMIN — PROPOFOL 30 MCG/KG/MIN: 10 INJECTION, EMULSION INTRAVENOUS at 10:03

## 2024-07-22 RX ADMIN — PROPOFOL 150 MG: 10 INJECTION, EMULSION INTRAVENOUS at 09:50

## 2024-07-22 RX ADMIN — SUGAMMADEX 200 MG: 100 INJECTION, SOLUTION INTRAVENOUS at 11:19

## 2024-07-22 RX ADMIN — DEXAMETHASONE SODIUM PHOSPHATE 4 MG: 4 INJECTION, SOLUTION INTRA-ARTICULAR; INTRALESIONAL; INTRAMUSCULAR; INTRAVENOUS; SOFT TISSUE at 10:12

## 2024-07-22 RX ADMIN — HYDROMORPHONE HYDROCHLORIDE 0.25 MG: 1 INJECTION, SOLUTION INTRAMUSCULAR; INTRAVENOUS; SUBCUTANEOUS at 11:18

## 2024-07-22 RX ADMIN — Medication 2 G: at 09:52

## 2024-07-22 RX ADMIN — KETOROLAC TROMETHAMINE 30 MG: 30 INJECTION, SOLUTION INTRAMUSCULAR at 11:12

## 2024-07-22 RX ADMIN — FENTANYL CITRATE 50 MCG: 50 INJECTION, SOLUTION INTRAMUSCULAR; INTRAVENOUS at 12:17

## 2024-07-22 RX ADMIN — METRONIDAZOLE 500 MG: 5 INJECTION, SOLUTION INTRAVENOUS at 09:26

## 2024-07-22 RX ADMIN — Medication 20 MG: at 10:30

## 2024-07-22 RX ADMIN — ACETAMINOPHEN 975 MG: 325 TABLET, FILM COATED ORAL at 12:10

## 2024-07-22 RX ADMIN — LIDOCAINE HYDROCHLORIDE 80 MG: 20 INJECTION, SOLUTION INFILTRATION; PERINEURAL at 09:49

## 2024-07-22 RX ADMIN — PHENYLEPHRINE HYDROCHLORIDE 100 MCG: 10 INJECTION INTRAVENOUS at 10:27

## 2024-07-22 RX ADMIN — HYDROMORPHONE HYDROCHLORIDE 0.4 MG: 0.2 INJECTION, SOLUTION INTRAMUSCULAR; INTRAVENOUS; SUBCUTANEOUS at 12:32

## 2024-07-22 RX ADMIN — FENTANYL CITRATE 100 MCG: 50 INJECTION INTRAMUSCULAR; INTRAVENOUS at 09:49

## 2024-07-22 RX ADMIN — HEPARIN SODIUM 5000 UNITS: 5000 INJECTION, SOLUTION INTRAVENOUS; SUBCUTANEOUS at 09:32

## 2024-07-22 RX ADMIN — ONDANSETRON 4 MG: 2 INJECTION INTRAMUSCULAR; INTRAVENOUS at 11:07

## 2024-07-22 RX ADMIN — FENTANYL CITRATE 50 MCG: 50 INJECTION, SOLUTION INTRAMUSCULAR; INTRAVENOUS at 12:09

## 2024-07-22 RX ADMIN — IBUPROFEN 800 MG: 400 TABLET, FILM COATED ORAL at 14:38

## 2024-07-22 RX ADMIN — EPHEDRINE SULFATE 5 MG: 5 INJECTION INTRAVENOUS at 10:30

## 2024-07-22 ASSESSMENT — ACTIVITIES OF DAILY LIVING (ADL)
ADLS_ACUITY_SCORE: 31

## 2024-07-22 ASSESSMENT — ENCOUNTER SYMPTOMS: SEIZURES: 0

## 2024-07-22 ASSESSMENT — LIFESTYLE VARIABLES: TOBACCO_USE: 0

## 2024-07-22 NOTE — ANESTHESIA PREPROCEDURE EVALUATION
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     No history of anesthetic complications       ROS/MED HX  ENT/Pulmonary:    (-) tobacco use, asthma, sleep apnea and recent URI   Neurologic:  - neg neurologic ROS  (-) no seizures and no CVA   Cardiovascular:     (+)  - -   -  - -                                 Previous cardiac testing   Echo: Date: 23 Results:  See H&P  Stress Test:  Date: Results:    ECG Reviewed:  Date: Results:    Cath:  Date: Results:      METS/Exercise Tolerance: >4 METS    Hematologic:  - neg hematologic  ROS  (-) history of blood clots and history of blood transfusion   Musculoskeletal:  - neg musculoskeletal ROS     GI/Hepatic:  - neg GI/hepatic ROS  (-) GERD and liver disease   Renal/Genitourinary: Comment: S/p hysterectomy 3/2024   (-) renal disease   Endo:  - neg endo ROS  (-) Type II DM   Psychiatric/Substance Use:  - neg psychiatric ROS     Infectious Disease:  - neg infectious disease ROS     Malignancy:   (+) Malignancy, History of Other.Other CA STIL of the left fallopian tube Active status post.    Other:  - neg other ROS          Physical Exam    Airway        Mallampati: II   TM distance: > 3 FB   Neck ROM: full   Mouth opening: > 3 cm    Respiratory  "Devices and Support         Dental     Comment: wnl        Cardiovascular          Rhythm and rate: regular and normal     Pulmonary           breath sounds clear to auscultation           OUTSIDE LABS:  CBC:   Lab Results   Component Value Date    WBC 9.0 07/12/2024    WBC 15.1 (H) 06/26/2023    HGB 13.8 07/12/2024    HGB 9.7 (L) 06/26/2023    HCT 42.9 07/12/2024    HCT 31.8 (L) 06/26/2023     07/12/2024     06/26/2023     BMP:   Lab Results   Component Value Date     07/12/2024     (L) 06/26/2023    POTASSIUM 3.6 07/12/2024    POTASSIUM 3.6 06/26/2023    CHLORIDE 104 07/12/2024    CHLORIDE 102 06/26/2023    CO2 25 07/12/2024    CO2 18 (L) 06/26/2023    BUN 11.2 07/12/2024    BUN 14.0 06/26/2023    CR 0.68 07/12/2024    CR 0.74 06/26/2023     (H) 07/12/2024     (H) 06/26/2023     COAGS: No results found for: \"PTT\", \"INR\", \"FIBR\"  POC:   Lab Results   Component Value Date    HCG Positive (A) 07/22/2007    HCGS Negative 06/26/2023     HEPATIC:   Lab Results   Component Value Date    ALBUMIN 4.5 07/12/2024    PROTTOTAL 7.5 07/12/2024    ALT 15 07/12/2024    AST 18 07/12/2024    ALKPHOS 84 07/12/2024    BILITOTAL <0.2 07/12/2024     OTHER:   Lab Results   Component Value Date    A1C 5.6 06/26/2023    ROB 9.4 07/12/2024    LIPASE 38 06/26/2023       Anesthesia Plan    ASA Status:  1    NPO Status:  NPO Appropriate    Anesthesia Type: General.     - Airway: ETT   Induction: Intravenous.   Maintenance: Balanced.        Consents    Anesthesia Plan(s) and associated risks, benefits, and realistic alternatives discussed. Questions answered and patient/representative(s) expressed understanding.     - Discussed:     - Discussed with:  Patient      - Extended Intubation/Ventilatory Support Discussed: No.      - Patient is DNR/DNI Status: No     Use of blood products discussed: No .     Postoperative Care    Pain management: IV analgesics, Oral pain medications.   PONV prophylaxis: " Ondansetron (or other 5HT-3), Dexamethasone or Solumedrol     Comments:    Other Comments: NPO. No meds, healthy. No problems with anesthesia in past. Had some delay in discharge due to somnolence with hysterectomy. Chart reviewed shows TIVA and dilaudid and fentanyl, which is likely cause.           Sharon Stewart MD    I have reviewed the pertinent notes and labs in the chart from the past 30 days and (re)examined the patient.  Any updates or changes from those notes are reflected in this note.

## 2024-07-22 NOTE — OP NOTE
[facility] Gynecologic Operative Note   Brianna Hernandez  [id number]  7/22/2024    Preoperative Diagnosis:   - STIC Lesion     Postoperative Diagnosis:   - STIC Lesion     Procedure: Laparoscopic Bilateral Oophorectomy, Peritoneal Biopsies, Omental Biopsy, Peritoneal Washings    Surgeon: Netta Shepherd MD     Assistant(s): Simeon Travis MD PGY4    Anesthesia: General endotracheal     Complications: none     EBL: 5 mL   IVF: 1000 mL crystalloid   UOP: 250 mL clear urine     Findings:  EUA revealed surgically absent uterus, and no adnexal masses noted. Vaginal cuff palpated and in tact. External genitalia, vaginal vault and cervix appeared normal. On laparoscopy, normal appearing liver edge, gallbladder, omentum, bowel and appendix. Surgically absent uterus and bilateral fallopian tubes. Adhesions of sigmoid to left pelvic side wall. Adhesions, likely post operative from rectal epiploic to colpotomy closure site. Appendix visualized. Hemostatic surgical sites at end of case.     ID Type Source Tests Collected by Time Destination   1 : Pelvic Washing Washings Pelvis NON-GYNECOLOGIC CYTOLOGY Netta Shepherd MD 7/22/2024 10:41 AM    2 : [place] Biopsy Biopsy Other SURGICAL PATHOLOGY EXAM Netta Shepherd MD 7/22/2024 10:44 AM    3 : Bladder Peritoneum Biopsy Other SURGICAL PATHOLOGY EXAM Netta Shepherd MD 7/22/2024 10:49 AM    4 : Anterior Abdominal Wall Biopsy Other SURGICAL PATHOLOGY EXAM Netta Shepherd MD 7/22/2024 10:50 AM    5 : Left Pelvic Side Wall Biopsy Pelvis SURGICAL PATHOLOGY EXAM Netta Shepherd MD 7/22/2024 10:51 AM    6 : Right Pelvic Side Wall Biopsy Pelvis SURGICAL PATHOLOGY EXAM Netta Shepherd MD 7/22/2024 10:54 AM    7 : Left Ovary Biopsy Ovary, Left SURGICAL PATHOLOGY EXAM Netta Shepherd MD 7/22/2024 10:58 AM    8 : RIght Ovary Biopsy Ovary, Right SURGICAL PATHOLOGY EXAM Netta Shepherd MD 7/22/2024 10:58 AM    9 : Omentum Biopsy Biopsy Omentum SURGICAL PATHOLOGY EXAM Netta Shepherd MD 7/22/2024 10:59  AM          Indications: Brianna Hernandez is a 43 year old female who presented to clinic with concern for STIC Lesion after review of pathology. Her genetic testing was negative. Given her family history as well as STIC lesion completion of pelvic staging was recommended. Pelvic washings, a bilateral oophorectomy, omental biopsies and peritoneal biopsies were recommended. All risks, benefits and alternatives were discussed and written informed consent was obtained.     Procedure:   The patient was brought to the operating room, where adequate general endotracheal anesthesia was administered. She was placed in the dorsal lithotomy position with her feet in yellow fin stirrups. Exam under anesthesia revealed the findings noted above. She was prepped and draped in the usual sterile fashion. Surgical time out was performed. A harley catheter was placed.    A 2 cm vertical incision was made with the scalpel superior to the umbilicus. The subcutaneous tissue was cleared from the underlying fascia using S-retractors. The fascia was grasped between two Kocher clamps, elevated, and sharply incised. The fascia was tagged with an 0 Vicryl suture on each side. The peritoneum was entered bluntly. A 12 mm Jones trocar was placed. The abdomen was insufflated to a maximum pressure of 15 mmHg. Laparoscope was placed and revealed no trauma from entry. Survey of the abdomen revealed the findings noted above. Right and left lower quadrant 5 mm ports were placed automatically under direct visualization with care to avoid the inferior epigastric vasculature.     Pelvic washings were collected using the suction . The left ureter was identified transperitoneally and noted to vermiculate. The left infundibular pelvic ligament was serially cauterized and transected with the Ligasure. The dissection was continued along the broad ligament to the uterine cornua. The left fallopian tube and uteroovarian ligament were serially cauterized  and transected with the Ligasure. This procedure was then completed in identical fashion on the right side. The specimens were placed in an endocatch bag and removed from the abdomen. The surgical sites were reexamined and noted to be hemostatic and the ureters were noted to vermiculate bilaterally. Biopsies were obtained from the anterior abdominal wall, cul-de-sac, bladder peritoneum, left and right pelvic side wall.    The abdomen was desufflated and trocars were removed. The 12 mm fascial incision was closed with a running 0 Vicryl suture. The skin incisions were closed with 4-0 Monocryl and covered with Exofin glue. The harley catheter was removed.    All sponge, needle, and instrument counts were correct. The patient tolerated the procedure well and was transferred to recovery in stable condition. Dr. Shepherd was present and scrubbed for the entire procedure.      Instrument, sponge, and needle counts were correct times 2. Dr. Shepherd was present and scrubbed for the entire procedure. The patient was extubated in the operating room and transferred to the PACU in stable condition.    Simeon Travis MD  [facility]yn[facility] Resident PGY-4  Pager: [phone]  7/22/2024 2:58 PM      Attending Attestation:  I was present and scrubbed for the entire surgical procedure.  I have reviewed and edited above note and agree with findings as documented.    Netta Shepherd MD  Gynecologic Oncology  [facility]

## 2024-07-22 NOTE — ANESTHESIA PROCEDURE NOTES
Airway       Patient location during procedure: OR       Procedure Start/Stop Times: 7/22/2024 9:53 AM  Staff -        Anesthesiologist:  Sharon Stewart MD       CRNA: Nora Curry APRN CRNA       Other Anesthesia Staff: Leslie Hogan       Performed By: SRNA  Consent for Airway        Urgency: elective  Indications and Patient Condition       Indications for airway management: france-procedural       Induction type:intravenous       Mask difficulty assessment: 1 - vent by mask    Final Airway Details       Final airway type: endotracheal airway       Successful airway: ETT - single  Endotracheal Airway Details        ETT size (mm): 7.0       Cuffed: yes       Cuff volume (mL): 7       Successful intubation technique: direct laryngoscopy       DL Blade Type: Murdock 2       Grade View of Cords: 2       Adjucts: stylet       Position: Left       Measured from: gums/teeth       Secured at (cm): 22       Bite block used: None    Post intubation assessment        Placement verified by: capnometry, equal breath sounds and chest rise        Number of attempts at approach: 1       Number of other approaches attempted: 0       Secured with: tape       Ease of procedure: easy       Dentition: Intact and Unchanged    Medication(s) Administered   Medication Administration Time: 7/22/2024 9:53 AM

## 2024-07-22 NOTE — ANESTHESIA CARE TRANSFER NOTE
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    Anesthesia Type:   General     Note:    Oropharynx: spontaneously breathing and oral airway in place  Level of Consciousness: drowsy  Oxygen Supplementation: face mask  Level of Supplemental Oxygen (L/min / FiO2): 6    Dentition: dentition unchanged  Vital Signs Stable: post-procedure vital signs reviewed and stable  Report to RN Given: handoff report given  Patient transferred to: PACU    Handoff Report: Identifed the Patient, Identified the Reponsible Provider, Reviewed the pertinent medical history, Discussed the surgical course, Reviewed Intra-OP anesthesia mangement and issues during anesthesia, Set expectations for post-procedure period and Allowed opportunity for questions and acknowledgement of understanding    Vitals:  Vitals Value Taken Time   /64 07/22/24 1145   Temp     Pulse 75 07/22/24 1148   Resp 23 07/22/24 1148   SpO2 100 % 07/22/24 1148   Vitals shown include unfiled device data.    Electronically Signed By: CAROLINE Hollingsworth CRNA  July 22, 2024  11:48 AM

## 2024-07-22 NOTE — PROGRESS NOTES
Gynecologic Oncology Postoperative Check Note  7/22/2024    S: Patient reports she is doing ok postoperatively. Tired. Pain is controlled with current pain regimen. Ambulating without difficulty. Had some lightheadedness earlier but is now resolved. Voiding spontaneously. Tolerating crackers and water without nausea or vomiting. Denies chest pain, shortness of breath, dizziness, or other concerns at this time.     O:  Vitals:    07/22/24 1300 07/22/24 1351 07/22/24 1440 07/22/24 1500   BP: 98/57 105/63 119/70 113/68   BP Location:       Pulse: 68  76    Resp: 13 15 16 18   Temp: 97.5  F (36.4  C) 97.7  F (36.5  C)     TempSrc: Oral Oral     SpO2: 100% 100% 98% 100%   Weight:       Height:       O2 RA    Gen: NAD. Mild sedated. Easily arousable to verbal stimuli. Responds appropriately to questions.   Cardio: RRR  Resp: CTAB, anteriorly  Abdomen: soft, appropriately tender, incision c/d/I dermabond in place  Extremities: No LE edema    A/P: 43 year old POD#0 s/p lsc ANGEL, peritoneal biopsies, omental biopsy, and peritoneal washings. Doing ok in the immediate postoperative period. VSS. Reviewed surgical procedure and plan for follow up in clinic. Await final pathology.    # Post-operative state  - Dz: STIC Lesion Left Fallopian Tube  - Patient meeting post-operative goals and is appropriate for discharge home at this time. Pain controlled with oral pain medications, voiding spontaneously, ambulating without difficulty or dizziness. Discussed post-op/discharge instructions. All questions were answered. Agreeable to discharge home with family.   - Post op follow up with Hina Driscoll NP on 8/5/2024.     Dispo: To home    Viviana Clark, APRN, DNP, CNP  7/22/2024 3:32 PM

## 2024-07-22 NOTE — DISCHARGE INSTRUCTIONS
Contacting your Doctor -   To contact a doctor, call Dr Shepherd's clinic at [phone]  or:  [phone] and ask for the resident on call for Gynecology and Oncology (answered 24 hours a day)   Emergency Department:  [facility]: [phone]  [facility]: [phone] 911 if you are in need of immediate or emergent help

## 2024-07-22 NOTE — BRIEF OP NOTE
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]  Post-operative diagnosis Same as pre-operative diagnosis    Procedure: Laparoscopic bilateral oophorectomy, peritoneal biopsies, omental biopsy, peritoneal washings, Bilateral - Abdomen  possible laparotomy and cancer staging, N/A - Abdomen    Surgeon: Surgeons and Role:     * Netta Shepherd MD - Primary     * Simeon Travis MD - Resident - Assisting  Anesthesia: General   Estimated Blood Loss: 5 ml    Drains: None  Specimens:   ID Type Source Tests Collected by Time Destination   1 : Pelvic Washing Washings Pelvis NON-GYNECOLOGIC CYTOLOGY Netta Shepherd MD 7/22/2024 10:41 AM    2 : [place] Biopsy Biopsy Other SURGICAL PATHOLOGY EXAM Netta Shepherd MD 7/22/2024 10:44 AM    3 : Bladder Peritoneum Biopsy Other SURGICAL PATHOLOGY EXAM Netta Shepherd MD 7/22/2024 10:49 AM    4 : Anterior Abdominal Wall Biopsy Other SURGICAL PATHOLOGY EXAM Netta Shepherd MD 7/22/2024 10:50 AM    5 : Left Pelvic Side Wall Biopsy Pelvis SURGICAL PATHOLOGY EXAM Netta Shepherd MD 7/22/2024 10:51 AM    6 : Right Pelvic Side Wall Biopsy Pelvis SURGICAL PATHOLOGY EXAM Netta Shepherd MD 7/22/2024 10:54 AM    7 : Left Ovary Biopsy Ovary, Left SURGICAL PATHOLOGY EXAM Netta Shepherd MD 7/22/2024 10:58 AM    8 : RIght Ovary Biopsy Ovary, Right SURGICAL PATHOLOGY EXAM Netta Shepherd MD 7/22/2024 10:58 AM    9 : Omentum Biopsy Biopsy Omentum SURGICAL PATHOLOGY EXAM Netta Shepherd MD 7/22/2024 10:59 AM      Findings:   EUA revealed surgically absent uterus, and no adnexal masses noted. Vaginal cuff palpated and in tact. External genitalia, vaginal vault and cervix appeared normal. On laparoscopy, normal appearing liver edge, gallbladder, omentum, bowel and appendix. Surgically absent uterus and bilateral fallopian tubes. Adhesions of sigmoid to left pelvic side wall.  Adhesions, likely post operative from rectal epiploic to colpotomy closure site. Appendix visualized. Hemostatic surgical sites at end of case.    Complications: None.  Implants: * No implants in log *    Simeon Travis MD  GynONC Resident PGY-4  Pager: [phone]  7/22/2024 11:31 AM

## 2024-07-22 NOTE — ANESTHESIA POSTPROCEDURE EVALUATION
Patient: Brianna Hernandez    Procedure: Procedure(s):  Laparoscopic bilateral oophorectomy, peritoneal biopsies, omental biopsy, peritoneal washings  possible laparotomy and cancer staging       Anesthesia Type:  General    Note:  Disposition: Outpatient   Postop Pain Control: Uneventful            Sign Out: Well controlled pain   PONV: No   Neuro/Psych: Uneventful            Sign Out: Acceptable/Baseline neuro status   Airway/Respiratory: Uneventful            Sign Out: Acceptable/Baseline resp. status   CV/Hemodynamics: Uneventful            Sign Out: Acceptable CV status; No obvious hypovolemia; No obvious fluid overload   Other NRE: NONE   DID A NON-ROUTINE EVENT OCCUR? No           Last vitals:  Vitals Value Taken Time   BP 98/57 07/22/24 1300   Temp 36.3  C (97.3  F) 07/22/24 1245   Pulse 73 07/22/24 1320   Resp 17 07/22/24 1320   SpO2 100 % 07/22/24 1320   Vitals shown include unfiled device data.    Electronically Signed By: Sharon Stewart MD  July 22, 2024  1:21 PM

## 2024-07-23 LAB
PATH REPORT.COMMENTS IMP SPEC: NORMAL
PATH REPORT.COMMENTS IMP SPEC: NORMAL
PATH REPORT.FINAL DX SPEC: NORMAL
PATH REPORT.GROSS SPEC: NORMAL
PATH REPORT.MICROSCOPIC SPEC OTHER STN: NORMAL
PATH REPORT.RELEVANT HX SPEC: NORMAL

## 2024-07-31 LAB
PATH REPORT.COMMENTS IMP SPEC: NORMAL
PATH REPORT.COMMENTS IMP SPEC: NORMAL
PATH REPORT.FINAL DX SPEC: NORMAL
PATH REPORT.GROSS SPEC: NORMAL
PATH REPORT.MICROSCOPIC SPEC OTHER STN: NORMAL
PATH REPORT.RELEVANT HX SPEC: NORMAL
PHOTO IMAGE: NORMAL

## 2024-08-02 NOTE — PROGRESS NOTES
Gynecologic Oncology Return Visit Note    Date: Aug 5, 2024     RE: Brianna Hernandez  : 1980  DANIEL: Aug 5, 2024     CC: Post operative state    HPI:  Brianna Hernandez is a 43 year old woman with a diagnosis of a left STIC.  She is s/p ANGEL 24.  She is here today for a post op visit.     Pertinent History:    3/20/24: TLH, BS  Had heavy periods and decided to undergo surgery.      Surg Path: OSH: Pathology  A) UTERUS WITH CERVIX AND FALLOPIAN TUBES, TOTAL HYSTERECTOMY WITH BILATERAL SALPINGECTOMY:   1. Cervix: No significant histologic abnormality   2. Endometrium: Proliferative phase   3. Myometrium: Leiomyomas   4. Uterine serosa: Unremarkable   5. Right fallopian tube (entirely submitted):   a. No significant histologic abnormality   b. Metallic coil grossly identified   6. Left fallopian tube (entirely submitted):   a. Incidental serous tubal intraepithelial lesion (STIL)   b. Metallic coil grossly identified   7. Uterine weight: 123 grams   8. Negative for malignancy      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  Genetic testing negative.     PMH:  Negative  PSH:  Hysterectomy  FH:  Mom breast cancer (53),  No genetic testing.  SH:  Lives with  and 2 kids.  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      7/3/24: CT CAP  IMPRESSION:   No evidence of metastasis in the chest, abdomen or pelvis.    24:  6.    24: Laparoscopic bilateral oophorectomy, peritoneal biopsies, omental biopsy, peritoneal washings   A. [place], Biopsy:  - Fibroadipose tissue with no significant histologic abnormality   B. Bladder Peritoneum, biopsy:  - Fibroadipose tissue with no  "significant histologic abnormality   C. Anterior Abdominal Wall, biopsy:  - Fibroadipose tissue with no significant histologic abnormality   D. Left Pelvic Side Wall, biopsy:  - Fibroadipose tissue with no significant histologic abnormality   E. Right Pelvic Side Wall, biopsy:  - Fibroadipose tissue with no significant histologic abnormality   F. Left Ovary, oophorectomy:  - Ovary with follicular cysts  - Negative for malignancy  G. RIght Ovary, oophorectomy:  - Ovary with follicular cysts  - Negative for malignancy  H. Omentum, Biopsy:  - Omental adipose tissue with no significant histologic abnormality  Washings   Interpretation:               Negative for malignancy (see comment)              Other Findings:               Chronic inflammation present               Today she comes to clinic feeling well overall.  She has seen her pathology report in my chart but has not seen Dr. Shepherd's comment on the pathology.  She has had no vaginal bleeding or discharge since her most recent procedure.  She continues to have some abdominal pain at this point is \"not too bad\".  She has not needed oxycodone since her discharge from the hospital and has not needed Tylenol or ibuprofen for about a week.  She is eating and drinking without difficulty.  No nausea or vomiting.  Her bowel habits have returned to normal.  No new leg swelling.  No fever/chills.  No chest pain.  No shortness of breath.  She has noticed some heart palpitations and hot flashes.              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         Past Surgical History:    Past Surgical History:   Procedure Laterality Date    HYSTERECTOMY  03/2024    [place]    LAPAROSCOPIC OOPHORECTOMY Bilateral 7/22/2024    Procedure: Laparoscopic bilateral oophorectomy, peritoneal biopsies, omental biopsy, peritoneal washings;  Surgeon: Netta Shepherd MD;  Location:  OR         [facility] " Maintenance Due   Topic Date Due    ADVANCE CARE PLANNING  Never done    Pneumococcal Vaccine: Pediatrics (0 to 5 Years) and At-Risk Patients (6 to 64 Years) (1 of 2 - PCV) Never done    HIV SCREENING  Never done    HEPATITIS C SCREENING  Never done    HEPATITIS B IMMUNIZATION (2 of 3 - 19+ 3-dose series) 01/04/2010    DTAP/TDAP/TD IMMUNIZATION (2 - Td or Tdap) 12/07/2019    LIPID  Never done    COVID-19 Vaccine (3 - Moderna risk series) 05/19/2021    YEARLY PREVENTIVE VISIT  08/10/2024       Current Medications:     Current Outpatient Medications   Medication Sig Dispense Refill    acetaminophen (TYLENOL) 325 MG tablet Take 2 tablets (650 mg) by mouth every 6 hours as needed for mild pain 24 tablet 0    ibuprofen (ADVIL/MOTRIN) 600 MG tablet Take 1 tablet (600 mg) by mouth every 6 hours as needed for other (mild and/or inflammatory pain.) 12 tablet 0    Multiple Vitamin (MULTIVITAMIN ADULT PO) Take by mouth daily      oxyCODONE (ROXICODONE) 5 MG tablet Take 1 tablet (5 mg) by mouth every 6 hours as needed for severe pain 6 tablet 0    senna-docusate (SENOKOT-S/PERICOLACE) 8.6-50 MG tablet Take 1-2 tablets by mouth 2 times daily 30 tablet 0         Allergies:      No Known Allergies     Social History:     Social History     Tobacco Use    Smoking status: Never     Passive exposure: Never    Smokeless tobacco: Never   Substance Use Topics    Alcohol use: Never       History   Drug Use Unknown         Family History:     The patient's family history is notable for:    Family History   Problem Relation Age of Onset    Anesthesia Reaction No family hx of     Deep Vein Thrombosis (DVT) No family hx of          Physical Exam:     /70 (BP Location: Right arm, Patient Position: Sitting, Cuff Size: Adult Regular)   Pulse 77   Temp 98  F (36.7  C) (Oral)   Wt 60.5 kg (133 lb 4.8 oz)   SpO2 100%   BMI 22.18 kg/m    Body mass index is 22.18 kg/m .    General Appearance: alert, no distress     HEENT: no palpable  nodules or masses        Cardiovascular: regular rate and rhythm, no gallops, rubs or murmurs     Respiratory: lungs clear, no rales, rhonchi or wheezes    Musculoskeletal: extremities non tender and without edema    Skin: no lesions or rashes     Neurological: normal gait, no gross defects     Psychiatric: appropriate mood and affect                               Hematological: normal cervical and supraclavicular lymph nodes     Gastrointestinal:       abdomen soft, non-tender, non-distended, laps ites intact, healing well without erythema, induration, or drainage.  Dermabond present on lap site superior to umbilicus with slight healing ecchymosis.     Genitourinary: Deferred.     No results found for this or any previous visit (from the past 24 hour(s)).       Assessment:    Brianna Hernandez is a 43 year old woman with a diagnosis of a left STIC.  She is s/p ANGEL 7/22/24.  She is here today for a post op visit.     20 minutes spent on the date of the encounter doing chart review, history and exam, documentation, and further activities as noted above.    No charge for visit.      Plan:     1.) Post operative state:  Discussed pathology results which were benign.  Surveillance for her STIC (if any) will be discussed at her next visit with Dr. Shepherd at her visit in September.  Incision sites are healing well Dermabond still in place on her lap site superior to her umbilicus which will dissolve with time.  Continue to limit lifting anything >10-15 lbs and strenuous activity until she is 6 weeks postop.  Okay to begin hormone replacement and advised based on Dr. Shepherd's recommendations for her to see her benign gynecologist for hormone replacement and management.  Reviewed signs and symptoms for when she should contact the clinic or seek additional care. Patient to contact the clinic with any questions or concerns.        Genetic risk factors were assessed and she was negative.    Labs and/or tests ordered include:   None.     2.) Health maintenance:  Issues addressed today include following up with PCP for annual health maintenance and non-gynecologic issues.       Hina Driscoll, DNP, APRN, FNP-C, AOCNP  Oncology Nurse Practitioner  Division of Gynecologic Oncology  Pager: [phone]     CC  Patient Care Team:  Cristina Tracy MD as PCP - General  Netta Shepherd MD as MD (Gynecologic Oncology)  Netta Shepherd MD as Assigned Cancer Care Provider

## 2024-08-05 ENCOUNTER — ONCOLOGY VISIT (OUTPATIENT)
Dept: ONCOLOGY | Facility: CLINIC | Age: 44
End: 2024-08-05
Attending: OBSTETRICS & GYNECOLOGY
Payer: COMMERCIAL

## 2024-08-05 VITALS
WEIGHT: 133.3 LBS | OXYGEN SATURATION: 100 % | DIASTOLIC BLOOD PRESSURE: 70 MMHG | TEMPERATURE: 98 F | BODY MASS INDEX: 22.18 KG/M2 | SYSTOLIC BLOOD PRESSURE: 103 MMHG | HEART RATE: 77 BPM

## 2024-08-05 DIAGNOSIS — Z98.890 POSTOPERATIVE STATE: Primary | ICD-10-CM

## 2024-08-05 PROCEDURE — 99213 OFFICE O/P EST LOW 20 MIN: CPT | Performed by: NURSE PRACTITIONER

## 2024-08-05 PROCEDURE — 99024 POSTOP FOLLOW-UP VISIT: CPT | Performed by: NURSE PRACTITIONER

## 2024-08-05 ASSESSMENT — PAIN SCALES - GENERAL: PAINLEVEL: NO PAIN (0)

## 2024-08-05 NOTE — NURSING NOTE
"Oncology Rooming Note    August 5, 2024 3:11 PM   Brianna Hernandez is a 43 year old female who presents for:    Chief Complaint   Patient presents with    Oncology Clinic Visit     Laparoscopic bilateral oophorectomy, peritoneal     Initial Vitals: /70 (BP Location: Right arm, Patient Position: Sitting, Cuff Size: Adult Regular)   Pulse 77   Temp 98  F (36.7  C) (Oral)   Wt 60.5 kg (133 lb 4.8 oz)   SpO2 100%   BMI 22.18 kg/m   Estimated body mass index is 22.18 kg/m  as calculated from the following:    Height as of 7/22/24: 1.651 m (5' 5\").    Weight as of this encounter: 60.5 kg (133 lb 4.8 oz). Body surface area is 1.67 meters squared.  No Pain (0) Comment: Data Unavailable   No LMP recorded. Patient has had a hysterectomy.  Allergies reviewed: Yes  Medications reviewed: Yes    Medications: Medication refills not needed today.  Pharmacy [facility] entered into [id number] [place], [place] - [address] AT [facility] [address]    Frailty Screening:   Is the patient here for a new oncology consult visit in cancer care? 2. No      Clinical concerns: wonders if she will do hormone therapy post-op. Has questions about pathology report        Miguel Agarwal              "
warm

## 2024-08-05 NOTE — LETTER
2024      Brianna Hernandez  [address] [place] [address]      Dear Colleague,    Thank you for referring your patient, Brianna Hernandez, to the [facility]. Please see a copy of my visit note below.    Gynecologic Oncology Return Visit Note    Date: Aug 5, 2024     RE: Brianna Hernandez  : 1980  DANIEL: Aug 5, 2024     CC: Post operative state    HPI:  Brianna Hernandez is a 43 year old woman with a diagnosis of a left STIC.  She is s/p ANGEL 24.  She is here today for a post op visit.     Pertinent History:    3/20/24: TLH, BS  Had heavy periods and decided to undergo surgery.      Surg Path: OSH: Pathology  A) UTERUS WITH CERVIX AND FALLOPIAN TUBES, TOTAL HYSTERECTOMY WITH BILATERAL SALPINGECTOMY:   1. Cervix: No significant histologic abnormality   2. Endometrium: Proliferative phase   3. Myometrium: Leiomyomas   4. Uterine serosa: Unremarkable   5. Right fallopian tube (entirely submitted):   a. No significant histologic abnormality   b. Metallic coil grossly identified   6. Left fallopian tube (entirely submitted):   a. Incidental serous tubal intraepithelial lesion (STIL)   b. Metallic coil grossly identified   7. Uterine weight: 123 grams   8. 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  Genetic testing negative.     PMH:  Negative  PSH:  Hysterectomy  FH:  Mom breast cancer (53),  No genetic testing.  SH:  Lives with  and 2 kids.  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      7/3/24: CT CAP  IMPRESSION:   No evidence of metastasis in the chest, abdomen or pelvis.    24:  6.    24: Laparoscopic  "bilateral oophorectomy, peritoneal biopsies, omental biopsy, peritoneal washings   A. [place], Biopsy:  - Fibroadipose tissue with no significant histologic abnormality   B. Bladder Peritoneum, biopsy:  - Fibroadipose tissue with no significant histologic abnormality   C. Anterior Abdominal Wall, biopsy:  - Fibroadipose tissue with no significant histologic abnormality   D. Left Pelvic Side Wall, biopsy:  - Fibroadipose tissue with no significant histologic abnormality   E. Right Pelvic Side Wall, biopsy:  - Fibroadipose tissue with no significant histologic abnormality   F. Left Ovary, oophorectomy:  - Ovary with follicular cysts  - Negative for malignancy  G. RIght Ovary, oophorectomy:  - Ovary with follicular cysts  - Negative for malignancy  H. Omentum, Biopsy:  - Omental adipose tissue with no significant histologic abnormality  [place]ashings   Interpretation:               Negative for malignancy (see comment)              Other Findings:               Chronic inflammation present               Today she comes to clinic feeling well overall.  She has seen her pathology report in my chart but has not seen Dr. Shepherd's comment on the pathology.  She has had no vaginal bleeding or discharge since her most recent procedure.  She continues to have some abdominal pain at this point is \"not too bad\".  She has not needed oxycodone since her discharge from the hospital and has not needed Tylenol or ibuprofen for about a week.  She is eating and drinking without difficulty.  No nausea or vomiting.  Her bowel habits have returned to normal.  No new leg swelling.  No fever/chills.  No chest pain.  No shortness of breath.  She has noticed some heart palpitations and hot flashes.              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         Past Surgical History:    Past Surgical History:   Procedure Laterality Date     HYSTERECTOMY  03/2024 "    [place]     LAPAROSCOPIC OOPHORECTOMY Bilateral 7/22/2024    Procedure: Laparoscopic bilateral oophorectomy, peritoneal biopsies, omental biopsy, peritoneal washings;  Surgeon: Netta Shepherd MD;  Location: [facility] OR         Health Maintenance Due   Topic Date Due     ADVANCE CARE PLANNING  Never done     Pneumococcal Vaccine: Pediatrics (0 to 5 Years) and At-Risk Patients (6 to 64 Years) (1 of 2 - PCV) Never done     HIV SCREENING  Never done     HEPATITIS C SCREENING  Never done     HEPATITIS B IMMUNIZATION (2 of 3 - 19+ 3-dose series) 01/04/2010     DTAP/TDAP/TD IMMUNIZATION (2 - Td or Tdap) 12/07/2019     LIPID  Never done     COVID-19 Vaccine (3 - Moderna risk series) 05/19/2021     YEARLY PREVENTIVE VISIT  08/10/2024       Current Medications:     Current Outpatient Medications   Medication Sig Dispense Refill     acetaminophen (TYLENOL) 325 MG tablet Take 2 tablets (650 mg) by mouth every 6 hours as needed for mild pain 24 tablet 0     ibuprofen (ADVIL/MOTRIN) 600 MG tablet Take 1 tablet (600 mg) by mouth every 6 hours as needed for other (mild and/or inflammatory pain.) 12 tablet 0     Multiple Vitamin (MULTIVITAMIN ADULT PO) Take by mouth daily       oxyCODONE (ROXICODONE) 5 MG tablet Take 1 tablet (5 mg) by mouth every 6 hours as needed for severe pain 6 tablet 0     senna-docusate (SENOKOT-S/PERICOLACE) 8.6-50 MG tablet Take 1-2 tablets by mouth 2 times daily 30 tablet 0         Allergies:      No Known Allergies     Social History:     Social History     Tobacco Use     Smoking status: Never     Passive exposure: Never     Smokeless tobacco: Never   Substance Use Topics     Alcohol use: Never       History   Drug Use Unknown         Family History:     The patient's family history is notable for:    Family History   Problem Relation Age of Onset     Anesthesia Reaction No family hx of      Deep Vein Thrombosis (DVT) No family hx of          Physical Exam:     /70 (BP Location: Right arm, Patient  Position: Sitting, Cuff Size: Adult Regular)   Pulse 77   Temp 98  F (36.7  C) (Oral)   Wt 60.5 kg (133 lb 4.8 oz)   SpO2 100%   BMI 22.18 kg/m    Body mass index is 22.18 kg/m .    General Appearance: alert, no distress     HEENT: no palpable nodules or masses        Cardiovascular: regular rate and rhythm, no gallops, rubs or murmurs     Respiratory: lungs clear, no rales, rhonchi or wheezes    Musculoskeletal: extremities non tender and without edema    Skin: no lesions or rashes     Neurological: normal gait, no gross defects     Psychiatric: appropriate mood and affect                               Hematological: normal cervical and supraclavicular lymph nodes     Gastrointestinal:       abdomen soft, non-tender, non-distended, laps ites intact, healing well without erythema, induration, or drainage.  Dermabond present on lap site superior to umbilicus with slight healing ecchymosis.     Genitourinary: Deferred.     No results found for this or any previous visit (from the past 24 hour(s)).       Assessment:    Brianna Hernandez is a 43 year old woman with a diagnosis of a left STIC.  She is s/p ANGEL 7/22/24.  She is here today for a post op visit.     20 minutes spent on the date of the encounter doing chart review, history and exam, documentation, and further activities as noted above.    No charge for visit.      Plan:     1.) Post operative state:  Discussed pathology results which were benign.  Surveillance for her STIC (if any) will be discussed at her next visit with Dr. Shepherd at her visit in September.  Incision sites are healing well Dermabond still in place on her lap site superior to her umbilicus which will dissolve with time.  Continue to limit lifting anything >10-15 lbs and strenuous activity until she is 6 weeks postop.  Okay to begin hormone replacement and advised based on Dr. Shepherd's recommendations for her to see her benign gynecologist for hormone replacement and management.  Reviewed  signs and symptoms for when she should contact the clinic or seek additional care. Patient to contact the clinic with any questions or concerns.        Genetic risk factors were assessed and she was negative.    Labs and/or tests ordered include:  None.     2.) Health maintenance:  Issues addressed today include following up with PCP for annual health maintenance and non-gynecologic issues.       Hina Driscoll DNP, APRN, FNP-C, AOCNP  Oncology Nurse Practitioner  Division of Gynecologic Oncology  Pager: [phone]     CC  Patient Care Team:  Cristina Tracy MD as PCP - General  Netta Shepherd MD as MD (Gynecologic Oncology)  Netta Shepherd MD as Assigned Cancer Care Provider      Again, thank you for allowing me to participate in the care of your patient.        Sincerely,        CAROLINE Polk CNP

## 2024-09-03 ENCOUNTER — ONCOLOGY VISIT (OUTPATIENT)
Dept: ONCOLOGY | Facility: CLINIC | Age: 44
End: 2024-09-03
Attending: OBSTETRICS & GYNECOLOGY
Payer: COMMERCIAL

## 2024-09-03 VITALS
WEIGHT: 132.9 LBS | OXYGEN SATURATION: 100 % | SYSTOLIC BLOOD PRESSURE: 113 MMHG | DIASTOLIC BLOOD PRESSURE: 77 MMHG | TEMPERATURE: 98.2 F | BODY MASS INDEX: 22.12 KG/M2 | RESPIRATION RATE: 16 BRPM | HEART RATE: 71 BPM

## 2024-09-03 DIAGNOSIS — N83.9 LESION OF FALLOPIAN TUBE: Primary | ICD-10-CM

## 2024-09-03 PROCEDURE — 99214 OFFICE O/P EST MOD 30 MIN: CPT | Performed by: OBSTETRICS & GYNECOLOGY

## 2024-09-03 PROCEDURE — G2211 COMPLEX E/M VISIT ADD ON: HCPCS | Performed by: OBSTETRICS & GYNECOLOGY

## 2024-09-03 PROCEDURE — 99213 OFFICE O/P EST LOW 20 MIN: CPT | Performed by: OBSTETRICS & GYNECOLOGY

## 2024-09-03 RX ORDER — VITAMIN B COMPLEX
1 TABLET ORAL DAILY
COMMUNITY

## 2024-09-03 ASSESSMENT — PAIN SCALES - GENERAL: PAINLEVEL: NO PAIN (0)

## 2024-09-03 NOTE — PATIENT INSTRUCTIONS
Cleared from surgical restrictions.  Can resume normal activities.    Follow-up with Dr. Tracy for menopausal management    No further Gyn Onc cares needed    Netta Shepherd MD  Gynecologic Oncology  [facility]

## 2024-09-03 NOTE — Clinical Note
9/3/2024      Brianna Hernandez  [address] [place] [address]      Dear Colleague,    Thank you for referring your patient, Brianna Hernandez, to the [facility]. Please see a copy of my visit note below.                            Consult Notes on Referred Patient    Date: 9/3/2024     CC: STIC lesion      HPI: Ms Brianna Hernandez is a 43 year old year old female who presents for consultation.      Patient has an abnormal pathology finding, STIL of the left fallopian tube. Discussed in depth. She has a family history of both breast and ovarian cancer. Mother had breast cancer age 53. Maternal Aunt has had ovarian cancer, another maternal aunt with ovarian cancer. And Brianna was two 1st cousins with ovarian cancer. None have been tested for BRCA. She was seen by the cancer geneticis, and was found to have overall risk of 19% and was told she could consider genetic testing. She then underwent a TLH, BS  with general gynecology which was positive for a STIL lesion for which she was then referred to gyn oncology.      Post-operatively she is healing well. No vaginal bleeding, energy back to normal.      Treatment History:     3/20/24: TLH, BS  Had heavy periods and decided to undergo surgery.      Surg Path: OSH: Pathology  A) UTERUS WITH CERVIX AND FALLOPIAN TUBES, TOTAL HYSTERECTOMY WITH BILATERAL SALPINGECTOMY:   1. Cervix: No significant histologic abnormality   2. Endometrium: Proliferative phase   3. Myometrium: Leiomyomas   4. Uterine serosa: Unremarkable   5. Right fallopian tube (entirely submitted):   a. No significant histologic abnormality   b. Metallic coil grossly identified   6. Left fallopian tube (entirely submitted):   a. Incidental serous tubal intraepithelial lesion (STIL)   b. Metallic coil grossly identified   7. Uterine weight: 123 grams   8. 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  Genetic testing negative.     7/22/24:  Laparoscopic Bilateral Oophorectomy, Peritoneal Biopsies, Omental Biopsy, Peritoneal Washings     Path reviewed and shows:  Final Diagnosis   A. [place], Biopsy:  - Fibroadipose tissue with no significant histologic abnormality      B. Bladder Peritoneum, biopsy:  - Fibroadipose tissue with no significant histologic abnormality      C. Anterior Abdominal Wall, biopsy:  - Fibroadipose tissue with no significant histologic abnormality      D. Left Pelvic Side Wall, biopsy:  - Fibroadipose tissue with no significant histologic abnormality      E. Right Pelvic Side Wall, biopsy:  - Fibroadipose tissue with no significant histologic abnormality      F. Left Ovary, oophorectomy:  - Ovary with follicular cysts  - Negative for malignancy     G. RIght Ovary, oophorectomy:  - Ovary with follicular cysts  - Negative for malignancy     H. Omentum, Biopsy:  - Omental adipose tissue with no significant histologic abnormality          PMH:  Negative     PSH:  Hysterectomy     FH:  Mom breast cancer (53),  No genetic testing.     SH:  Lives with  and 2 kids.  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         Past Surgical History:    Past Surgical History:   Procedure Laterality Date     HYSTERECTOMY  03/2024    [place]     LAPAROSCOPIC OOPHORECTOMY Bilateral 7/22/2024    Procedure: Laparoscopic bilateral oophorectomy, peritoneal biopsies, omental biopsy, peritoneal washings;  Surgeon: Netta Shepherd MD;  Location:  OR         Health Maintenance:  Health Maintenance Due   Topic  Date Due     ADVANCE CARE PLANNING  Never done     Pneumococcal Vaccine: Pediatrics (0 to 5 Years) and At-Risk Patients (6 to 64 Years) (1 of 2 - PCV) Never done     HIV SCREENING  Never done     HEPATITIS C SCREENING  Never done     HEPATITIS B IMMUNIZATION (2 of 3 - 19+ 3-dose series) 01/04/2010     DTAP/TDAP/TD IMMUNIZATION (2 - Td or Tdap) 12/07/2019     LIPID  Never done     COVID-19 Vaccine (3 - Moderna risk series) 05/19/2021     INFLUENZA VACCINE (1) 09/01/2024     YEARLY PREVENTIVE VISIT  08/10/2024       Last Pap Smear: ***              Result: {NORMAL/ABNORMAL:623035}  She {:530297} had a history of abnormal Pap smears.    Last Mammogram: ***              Result: {NORMAL/ABNORMAL:202107}      She {:[id number]} had a history of abnormal mammograms.    Last Colonoscopy: ***              Result: {NORMAL/ABNORMAL:267514}                    Last DEXA Scan: ***              Result: {NORMAL/ABNORMAL:695291}    Last Diabetes Screening; ***    Last Thyroid Screening:      ***    Last Cholest Screening:      ***      Current Medications:     has a current medication list which includes the following prescription(s): vitamin d3, acetaminophen, ibuprofen, multiple vitamin, oxycodone, and senna-docusate.       Allergies:     Patient has no known allergies.        Social History:     Social History     Tobacco Use     Smoking status: Never     Passive exposure: Never     Smokeless tobacco: Never   Substance Use Topics     Alcohol use: Never       History   Drug Use Unknown           Family History:     The patient's family history is notable for ***.    Family History   Problem Relation Age of Onset     Anesthesia Reaction No family hx of      Deep Vein Thrombosis (DVT) No family hx of          Physical Exam:     /77 (BP Location: Right arm, Patient Position: Sitting, Cuff Size: Adult Regular)   Pulse 71   Temp 98.2  F (36.8  C) (Oral)   Resp 16   Wt 60.3 kg (132 lb 14.4 oz)   SpO2 100%   BMI 22.12 kg/m    Body  mass index is 22.12 kg/m .    General Appearance: healthy and alert, no distress     HEENT:  no thyromegaly, no palpable nodules or masses        Cardiovascular: regular rate and rhythm, no gallops, rubs or murmurs     Respiratory: lungs clear, no rales, rhonchi or wheezes, normal diaphragmatic excursion    Musculoskeletal: extremities non tender and without edema    Skin: no lesions or rashes     Neurological: normal gait, no gross defects     Psychiatric: appropriate mood and affect                               Hematological: normal cervical, supraclavicular and inguinal lymph nodes     Gastrointestinal:       abdomen soft, non-tender, non-distended, no organomegaly or masses    Genitourinary: External genitalia and urethral meatus appears normal.  Vagina is smooth without nodularity or masses.  Cervix appears normal and without lesions.  Bimanual exam reveal no masses, nodularity or fullness.  Recto-vaginal exam confirms these findings.      Assessment:    Brianna Hernandez is a 44 year old woman with a new diagnosis of ***.     A total of *** minutes was spent with the patient, *** minutes of which were spent in counseling the patient and/or treatment planning.      Plan:     1.)   ***     2.) Genetic risk factors were assessed and the patient does not meet the qualifications for a referral.      3.) Labs and/or tests ordered include:  ***.     4.) Health maintenance issues addressed today include ***.    5.) Pre-op teaching was completed today.  Risks of surgery were discussed to include: bleeding, transfusion, infection, unintentional injury to surrounding organs/structures.      Thank you for allowing us to participate in the care of your patient.         Sincerely,        Patient Care Team:  Cristina Tracy MD as PCP - General  Netta Shepherd MD as MD (Gynecologic Oncology)  Hina Driscoll APRN CNP as Assigned Cancer Care Provider  SELF, REFERRED          Again, thank you for allowing me to participate in  the care of your patient.        Sincerely,        Netta Shepherd MD

## 2024-09-03 NOTE — PROGRESS NOTES
Consult Notes on Referred Patient    Date: 9/3/2024     CC: STIC lesion      HPI: Ms Brianna Hernandez is a 43 year old year old female who presents for consultation.      Patient has an abnormal pathology finding, STIL of the left fallopian tube. Discussed in depth. She has a family history of both breast and ovarian cancer. Mother had breast cancer age 53. Maternal Aunt has had ovarian cancer, another maternal aunt with ovarian cancer. And Brianna was two 1st cousins with ovarian cancer. None have been tested for BRCA. She was seen by the cancer geneticis, and was found to have overall risk of 19% and was told she could consider genetic testing. She then underwent a TLH, BS  with general gynecology which was positive for a STIL lesion for which she was then referred to gyn oncology.      Post-operatively she is healing well. No vaginal bleeding, energy back to normal.      Treatment History:     3/20/24: TLH, BS  Had heavy periods and decided to undergo surgery.      Surg Path: OSH: Pathology  A) UTERUS WITH CERVIX AND FALLOPIAN TUBES, TOTAL HYSTERECTOMY WITH BILATERAL SALPINGECTOMY:   1. Cervix: No significant histologic abnormality   2. Endometrium: Proliferative phase   3. Myometrium: Leiomyomas   4. Uterine serosa: Unremarkable   5. Right fallopian tube (entirely submitted):   a. No significant histologic abnormality   b. Metallic coil grossly identified   6. Left fallopian tube (entirely submitted):   a. Incidental serous tubal intraepithelial lesion (STIL)   b. Metallic coil grossly identified   7. Uterine weight: 123 grams   8. 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  Genetic testing negative.     7/22/24:  Laparoscopic Bilateral Oophorectomy, Peritoneal Biopsies, Omental Biopsy, Peritoneal Washings     Path reviewed and shows:  Final Diagnosis   A. [place], Biopsy:  - Fibroadipose tissue with no significant histologic abnormality      B. Bladder Peritoneum, biopsy:  - Fibroadipose tissue with no significant histologic abnormality      C. Anterior Abdominal Wall, biopsy:  - Fibroadipose tissue with no significant histologic abnormality      D. Left Pelvic Side Wall, biopsy:  - Fibroadipose tissue with no significant histologic abnormality      E. Right Pelvic Side Wall, biopsy:  - Fibroadipose tissue with no significant histologic abnormality      F. Left Ovary, oophorectomy:  - Ovary with follicular cysts  - Negative for malignancy     G. RIght Ovary, oophorectomy:  - Ovary with follicular cysts  - Negative for malignancy     H. Omentum, Biopsy:  - Omental adipose tissue with no significant histologic abnormality        Patient  seen today for follow-up. No fevers, no chills, no nausea or vomiting.  Appetite and energy good, weight stable.  No chest pain or shortness or breath.  No new pain symptoms.  No abdominal pain, no difficulty with bowel or bladder function, no constipation or diarrhea, no urinary leakage/urgency/pain.  No vaginal bleeding  No lower extremity pain or swelling.      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         Past Surgical History:    Past Surgical History:   Procedure Laterality Date    HYSTERECTOMY  03/2024    [place]    LAPAROSCOPIC OOPHORECTOMY Bilateral 7/22/2024    Procedure: Laparoscopic bilateral oophorectomy, peritoneal biopsies, omental biopsy, peritoneal washings;  Surgeon: Netta Shepherd MD;  Location:  OR         Health Maintenance:  Health Maintenance Due   Topic Date Due    ADVANCE CARE PLANNING  Never  done    Pneumococcal Vaccine: Pediatrics (0 to 5 Years) and At-Risk Patients (6 to 64 Years) (1 of 2 - PCV) Never done    HIV SCREENING  Never done    HEPATITIS C SCREENING  Never done    HEPATITIS B IMMUNIZATION (2 of 3 - 19+ 3-dose series) 01/04/2010    DTAP/TDAP/TD IMMUNIZATION (2 - Td or Tdap) 12/07/2019    LIPID  Never done    COVID-19 Vaccine (3 - Moderna risk series) 05/19/2021    INFLUENZA VACCINE (1) 09/01/2024    YEARLY PREVENTIVE VISIT  08/10/2024         Current Medications:     has a current medication list which includes the following prescription(s): vitamin d3, acetaminophen, ibuprofen, multiple vitamin, oxycodone, and senna-docusate.       Allergies:     Patient has no known allergies.        Social History:     Social History     Tobacco Use    Smoking status: Never     Passive exposure: Never    Smokeless tobacco: Never   Substance Use Topics    Alcohol use: Never       History   Drug Use Unknown           Family History:     The patient's family history is notable for :.    Family History   Problem Relation Age of Onset    Anesthesia Reaction No family hx of     Deep Vein Thrombosis (DVT) No family hx of          Physical Exam:     /77 (BP Location: Right arm, Patient Position: Sitting, Cuff Size: Adult Regular)   Pulse 71   Temp 98.2  F (36.8  C) (Oral)   Resp 16   Wt 60.3 kg (132 lb 14.4 oz)   SpO2 100%   BMI 22.12 kg/m    Body mass index is 22.12 kg/m .    General Appearance: healthy and alert, no distress       Musculoskeletal: extremities non tender and without edema    Skin: no lesions or rashes     Neurological: normal gait, no gross defects     Psychiatric: appropriate mood and affect                               Hematological: normal cervical, supraclavicular and inguinal lymph nodes     Gastrointestinal:       abdomen soft, non-tender, non-distended, no organomegaly or masses.  Incisions healing well.     Assessment:     Brianna Hernandez is a 43 year old woman with a diagnosis  of STIC lesion in setting of negative genetic testing status post  completion staging.          30 minutes spent on the date of the encounter doing chart review, history and exam, documentation and further activities as noted above     The longitudinal plan of care for the diagnosis(es)/condition(s) as documented were addressed during this visit. Due to the added complexity in care, I will continue to support Brianna in the subsequent management and with ongoing continuity of care.     Plan:      1.)   STIC Lesions :   Reviewed that STIC lesions have been proposed as a precursor of high grade serous carcinoma.  Incidence primarily reported in patients with BRCA mutations or strong family history with range of 0.6-6%.  This patient has a strong family history but her genetic testing is negative. In patients with incidental STIC lesions within general population, the prevelence of STIC is unclear, possibly in range of 0.76%.  Clinical signifcance and prognosis less clear.  Given her family history as well as STIC lesion, recommended completion staging with pelvic washings, bilateral oophorectomy, omental and peritoneal biopsies.     The patient has now undergone completion staging with negative  pathology.  Surgical findings reviewed in detail as well as recent pathology.  Given negative findings, no additional treatment needed. She has healed well from surgery.  Okay to resume normal activities.    Menopausal symptoms reviewed.  Recommend follow-up with Dr. Tracy for menopausal management.     Questions answered, she expressed understanding of plan  of care.     Netta Shepherd MD  Gynecologic Oncology  [facility]     CC  Patient Care Team:  Cristina Tracy MD as PCP - General  Netta Shepherd MD as MD (Gynecologic Oncology)  Hina Driscoll APRN CNP as Assigned Cancer Care Provider  SELF, REFERRED

## 2024-09-03 NOTE — NURSING NOTE
"Oncology Rooming Note    September 3, 2024 11:07 AM   Brianna Hernandez is a 44 year old female who presents for:    Chief Complaint   Patient presents with    Oncology Clinic Visit     malignant neoplasm of ovary     Initial Vitals: /77 (BP Location: Right arm, Patient Position: Sitting, Cuff Size: Adult Regular)   Pulse 71   Temp 98.2  F (36.8  C) (Oral)   Resp 16   Wt 60.3 kg (132 lb 14.4 oz)   SpO2 100%   BMI 22.12 kg/m   Estimated body mass index is 22.12 kg/m  as calculated from the following:    Height as of 7/22/24: 1.651 m (5' 5\").    Weight as of this encounter: 60.3 kg (132 lb 14.4 oz). Body surface area is 1.66 meters squared.  No Pain (0) Comment: Data Unavailable   No LMP recorded. Patient has had a hysterectomy.  Allergies reviewed: Yes  Medications reviewed: Yes    Medications: Medication refills not needed today.  Pharmacy name entered into [email or web address]: [id number] [place], [place] - [address] AT [facility] [address] [facility] [address]    Frailty Screening:   Is the patient here for a new oncology consult visit in cancer care? 2. No      Clinical concerns:  Pt would like to discuss their estrogen levels, bone health, and heart health.      Sonia Worthington            "

## 2024-11-09 ENCOUNTER — HEALTH MAINTENANCE LETTER (OUTPATIENT)
Age: 44
End: 2024-11-09

## (undated) DEVICE — WIPES FOLEY CARE SURESTEP PROVON DFC100

## (undated) DEVICE — ESU PENCIL W/COATED BLADE E2450H

## (undated) DEVICE — JELLY LUBRICATING SURGILUBE 2OZ TUBE

## (undated) DEVICE — TUBING IRRIG CYSTO/BLADDER SET 81" LF 2C4040

## (undated) DEVICE — ENDO TROCAR FIRST ENTRY KII FIOS ADV FIX 05X100MM CFF03

## (undated) DEVICE — PREP DYNA-HEX 4% CHG SCRUB 4OZ BOTTLE MDS098710

## (undated) DEVICE — SU DERMABOND ADVANCED .7ML DNX12

## (undated) DEVICE — CATH FOLEY 18FR 5ML LATEX 0165SI18

## (undated) DEVICE — ESU GROUND PAD ADULT W/CORD E7507

## (undated) DEVICE — ENDO TROCAR BLUNT TIP KII BALLOON 12X100MM C0R47

## (undated) DEVICE — Device

## (undated) DEVICE — SOL NACL 0.9% IRRIG 1000ML BOTTLE 2F7124

## (undated) DEVICE — SUCTION IRR STRYKERFLOW II W/TIP 250-070-520

## (undated) DEVICE — KIT PATIENT POSITIONING PIGAZZI LATEX FREE 40580

## (undated) DEVICE — TUBING SMOKE EVAC PNEUMOCLEAR HIGH FLOW 0620050250

## (undated) DEVICE — DRAPE SHEET MED 44X70" 9355

## (undated) DEVICE — LINEN TOWEL PACK X30 5481

## (undated) DEVICE — SUCTION MANIFOLD NEPTUNE 2 SYS 4 PORT 0702-020-000

## (undated) DEVICE — SOL NACL 0.9% INJ 1000ML BAG 2B1324X

## (undated) DEVICE — LINEN TOWEL PACK X6 WHITE 5487

## (undated) DEVICE — SOL WATER IRRIG 1000ML BOTTLE 2F7114

## (undated) DEVICE — SPECIMEN TRAP MUCOUS 40ML LUKI C30200A

## (undated) DEVICE — SU MONOCRYL 4-0 PS-2 27" UND Y426H

## (undated) DEVICE — ENDO TROCAR SLEEVE KII ADV FIXATION 05X100MM CFS02

## (undated) DEVICE — ENDO POUCH UNIV RETRIEVAL SYSTEM INZII 10MM CD001

## (undated) DEVICE — PREP CHLORAPREP 26ML TINTED HI-LITE ORANGE 930815

## (undated) DEVICE — GLOVE BIOGEL PI MICRO INDICATOR UNDERGLOVE SZ 6.0 48960

## (undated) DEVICE — ESU LIGASURE LAPAROSCOPIC BLUNT TIP SEALER 5MMX37CM LF1837

## (undated) DEVICE — GLOVE BIOGEL PI MICRO SZ 6.0 48560

## (undated) DEVICE — SU VICRYL 0 UR-6 27" J603H

## (undated) RX ORDER — HYDROMORPHONE HCL IN WATER/PF 6 MG/30 ML
PATIENT CONTROLLED ANALGESIA SYRINGE INTRAVENOUS
Status: DISPENSED
Start: 2024-07-22

## (undated) RX ORDER — PHENAZOPYRIDINE HYDROCHLORIDE 200 MG/1
TABLET, FILM COATED ORAL
Status: DISPENSED
Start: 2024-07-22

## (undated) RX ORDER — FENTANYL CITRATE 50 UG/ML
INJECTION, SOLUTION INTRAMUSCULAR; INTRAVENOUS
Status: DISPENSED
Start: 2024-07-22

## (undated) RX ORDER — EPHEDRINE SULFATE 50 MG/ML
INJECTION, SOLUTION INTRAMUSCULAR; INTRAVENOUS; SUBCUTANEOUS
Status: DISPENSED
Start: 2024-07-22

## (undated) RX ORDER — HEPARIN SODIUM 5000 [USP'U]/.5ML
INJECTION, SOLUTION INTRAVENOUS; SUBCUTANEOUS
Status: DISPENSED
Start: 2024-07-22

## (undated) RX ORDER — LIDOCAINE HYDROCHLORIDE 10 MG/ML
INJECTION, SOLUTION EPIDURAL; INFILTRATION; INTRACAUDAL; PERINEURAL
Status: DISPENSED
Start: 2024-07-22

## (undated) RX ORDER — PROPOFOL 10 MG/ML
INJECTION, EMULSION INTRAVENOUS
Status: DISPENSED
Start: 2024-07-22

## (undated) RX ORDER — ACETAMINOPHEN 325 MG/1
TABLET ORAL
Status: DISPENSED
Start: 2024-07-22

## (undated) RX ORDER — CEFAZOLIN SODIUM/WATER 2 G/20 ML
SYRINGE (ML) INTRAVENOUS
Status: DISPENSED
Start: 2024-07-22

## (undated) RX ORDER — ONDANSETRON 2 MG/ML
INJECTION INTRAMUSCULAR; INTRAVENOUS
Status: DISPENSED
Start: 2024-07-22

## (undated) RX ORDER — IBUPROFEN 400 MG/1
TABLET, FILM COATED ORAL
Status: DISPENSED
Start: 2024-07-22

## (undated) RX ORDER — IBUPROFEN 200 MG
TABLET ORAL
Status: DISPENSED
Start: 2024-07-22

## (undated) RX ORDER — SODIUM CHLORIDE, SODIUM LACTATE, POTASSIUM CHLORIDE, CALCIUM CHLORIDE 600; 310; 30; 20 MG/100ML; MG/100ML; MG/100ML; MG/100ML
INJECTION, SOLUTION INTRAVENOUS
Status: DISPENSED
Start: 2024-07-22

## (undated) RX ORDER — HYDROMORPHONE HYDROCHLORIDE 1 MG/ML
INJECTION, SOLUTION INTRAMUSCULAR; INTRAVENOUS; SUBCUTANEOUS
Status: DISPENSED
Start: 2024-07-22